# Patient Record
Sex: FEMALE | Race: WHITE | NOT HISPANIC OR LATINO
[De-identification: names, ages, dates, MRNs, and addresses within clinical notes are randomized per-mention and may not be internally consistent; named-entity substitution may affect disease eponyms.]

---

## 2019-01-16 PROBLEM — Z00.00 ENCOUNTER FOR PREVENTIVE HEALTH EXAMINATION: Status: ACTIVE | Noted: 2019-01-16

## 2021-04-10 ENCOUNTER — TRANSCRIPTION ENCOUNTER (OUTPATIENT)
Age: 56
End: 2021-04-10

## 2021-04-11 ENCOUNTER — INPATIENT (INPATIENT)
Facility: HOSPITAL | Age: 56
LOS: 0 days | Discharge: HOME | DRG: 353 | End: 2021-04-12
Attending: SURGERY | Admitting: SURGERY
Payer: COMMERCIAL

## 2021-04-11 ENCOUNTER — RESULT REVIEW (OUTPATIENT)
Age: 56
End: 2021-04-11

## 2021-04-11 ENCOUNTER — TRANSCRIPTION ENCOUNTER (OUTPATIENT)
Age: 56
End: 2021-04-11

## 2021-04-11 VITALS
OXYGEN SATURATION: 99 % | WEIGHT: 199.96 LBS | TEMPERATURE: 98 F | SYSTOLIC BLOOD PRESSURE: 180 MMHG | HEIGHT: 68 IN | RESPIRATION RATE: 16 BRPM | DIASTOLIC BLOOD PRESSURE: 107 MMHG | HEART RATE: 68 BPM

## 2021-04-11 DIAGNOSIS — Z90.49 ACQUIRED ABSENCE OF OTHER SPECIFIED PARTS OF DIGESTIVE TRACT: Chronic | ICD-10-CM

## 2021-04-11 DIAGNOSIS — K46.0 UNSPECIFIED ABDOMINAL HERNIA WITH OBSTRUCTION, WITHOUT GANGRENE: ICD-10-CM

## 2021-04-11 DIAGNOSIS — Z98.890 OTHER SPECIFIED POSTPROCEDURAL STATES: Chronic | ICD-10-CM

## 2021-04-11 LAB
ABO RH CONFIRMATION: SIGNIFICANT CHANGE UP
ALBUMIN SERPL ELPH-MCNC: 3.1 G/DL — LOW (ref 3.3–5)
ALP SERPL-CCNC: 117 U/L — SIGNIFICANT CHANGE UP (ref 40–120)
ALT FLD-CCNC: 27 U/L — SIGNIFICANT CHANGE UP (ref 12–78)
AMYLASE P1 CFR SERPL: 30 U/L — SIGNIFICANT CHANGE UP (ref 25–125)
ANION GAP SERPL CALC-SCNC: 5 MMOL/L — SIGNIFICANT CHANGE UP (ref 5–17)
APPEARANCE UR: CLEAR — SIGNIFICANT CHANGE UP
APTT BLD: 51.2 SEC — HIGH (ref 27.5–35.5)
AST SERPL-CCNC: 24 U/L — SIGNIFICANT CHANGE UP (ref 15–37)
BASE EXCESS BLDA CALC-SCNC: -7.7 MMOL/L — LOW (ref -2–2)
BILIRUB SERPL-MCNC: 0.6 MG/DL — SIGNIFICANT CHANGE UP (ref 0.2–1.2)
BILIRUB UR-MCNC: NEGATIVE — SIGNIFICANT CHANGE UP
BLOOD GAS COMMENTS ARTERIAL: SIGNIFICANT CHANGE UP
BUN SERPL-MCNC: 18 MG/DL — SIGNIFICANT CHANGE UP (ref 7–23)
CALCIUM SERPL-MCNC: 8.7 MG/DL — SIGNIFICANT CHANGE UP (ref 8.5–10.1)
CHLORIDE SERPL-SCNC: 112 MMOL/L — HIGH (ref 96–108)
CO2 SERPL-SCNC: 23 MMOL/L — SIGNIFICANT CHANGE UP (ref 22–31)
COLOR SPEC: SIGNIFICANT CHANGE UP
CREAT SERPL-MCNC: 1 MG/DL — SIGNIFICANT CHANGE UP (ref 0.5–1.3)
DIFF PNL FLD: NEGATIVE — SIGNIFICANT CHANGE UP
GLUCOSE SERPL-MCNC: 100 MG/DL — HIGH (ref 70–99)
GLUCOSE UR QL: NEGATIVE — SIGNIFICANT CHANGE UP
HCG SERPL-ACNC: 3 MIU/ML — SIGNIFICANT CHANGE UP
HCO3 BLDA-SCNC: SIGNIFICANT CHANGE UP MMOL/L (ref 23–27)
HCT VFR BLD CALC: 37.6 % — SIGNIFICANT CHANGE UP (ref 34.5–45)
HGB BLD-MCNC: 12.8 G/DL — SIGNIFICANT CHANGE UP (ref 11.5–15.5)
HOROWITZ INDEX BLDA+IHG-RTO: 60 — SIGNIFICANT CHANGE UP
INR BLD: 1.09 RATIO — SIGNIFICANT CHANGE UP (ref 0.88–1.16)
KETONES UR-MCNC: NEGATIVE — SIGNIFICANT CHANGE UP
LEUKOCYTE ESTERASE UR-ACNC: NEGATIVE — SIGNIFICANT CHANGE UP
LIDOCAIN IGE QN: 191 U/L — SIGNIFICANT CHANGE UP (ref 73–393)
MCHC RBC-ENTMCNC: 29.3 PG — SIGNIFICANT CHANGE UP (ref 27–34)
MCHC RBC-ENTMCNC: 34 GM/DL — SIGNIFICANT CHANGE UP (ref 32–36)
MCV RBC AUTO: 86 FL — SIGNIFICANT CHANGE UP (ref 80–100)
NITRITE UR-MCNC: NEGATIVE — SIGNIFICANT CHANGE UP
NRBC # BLD: 0 /100 WBCS — SIGNIFICANT CHANGE UP (ref 0–0)
PCO2 BLDA: 63 MMHG — HIGH (ref 32–46)
PH BLDA: 7.13 — CRITICAL LOW (ref 7.35–7.45)
PH UR: 6.5 — SIGNIFICANT CHANGE UP (ref 5–8)
PLATELET # BLD AUTO: 109 K/UL — LOW (ref 150–400)
PO2 BLDA: 73 MMHG — LOW (ref 74–108)
POTASSIUM SERPL-MCNC: 3.6 MMOL/L — SIGNIFICANT CHANGE UP (ref 3.5–5.3)
POTASSIUM SERPL-SCNC: 3.6 MMOL/L — SIGNIFICANT CHANGE UP (ref 3.5–5.3)
PROT SERPL-MCNC: 6.9 G/DL — SIGNIFICANT CHANGE UP (ref 6–8.3)
PROT UR-MCNC: NEGATIVE — SIGNIFICANT CHANGE UP
PROTHROM AB SERPL-ACNC: 12.7 SEC — SIGNIFICANT CHANGE UP (ref 10.6–13.6)
RBC # BLD: 4.37 M/UL — SIGNIFICANT CHANGE UP (ref 3.8–5.2)
RBC # FLD: 13 % — SIGNIFICANT CHANGE UP (ref 10.3–14.5)
SAO2 % BLDA: SIGNIFICANT CHANGE UP % (ref 92–96)
SARS-COV-2 RNA SPEC QL NAA+PROBE: SIGNIFICANT CHANGE UP
SODIUM SERPL-SCNC: 140 MMOL/L — SIGNIFICANT CHANGE UP (ref 135–145)
SP GR SPEC: 1 — LOW (ref 1.01–1.02)
UROBILINOGEN FLD QL: NEGATIVE — SIGNIFICANT CHANGE UP
WBC # BLD: 12.01 K/UL — HIGH (ref 3.8–10.5)
WBC # FLD AUTO: 12.01 K/UL — HIGH (ref 3.8–10.5)

## 2021-04-11 PROCEDURE — 99285 EMERGENCY DEPT VISIT HI MDM: CPT

## 2021-04-11 PROCEDURE — 99222 1ST HOSP IP/OBS MODERATE 55: CPT

## 2021-04-11 PROCEDURE — 93010 ELECTROCARDIOGRAM REPORT: CPT

## 2021-04-11 PROCEDURE — 49561: CPT | Mod: AS

## 2021-04-11 PROCEDURE — 99253 IP/OBS CNSLTJ NEW/EST LOW 45: CPT

## 2021-04-11 PROCEDURE — 88302 TISSUE EXAM BY PATHOLOGIST: CPT | Mod: 26

## 2021-04-11 PROCEDURE — 74177 CT ABD & PELVIS W/CONTRAST: CPT | Mod: 26,MA

## 2021-04-11 PROCEDURE — 71045 X-RAY EXAM CHEST 1 VIEW: CPT | Mod: 26,76

## 2021-04-11 RX ORDER — ACETAMINOPHEN 500 MG
1000 TABLET ORAL ONCE
Refills: 0 | Status: COMPLETED | OUTPATIENT
Start: 2021-04-11 | End: 2021-04-11

## 2021-04-11 RX ORDER — HYDROMORPHONE HYDROCHLORIDE 2 MG/ML
0.5 INJECTION INTRAMUSCULAR; INTRAVENOUS; SUBCUTANEOUS
Refills: 0 | Status: DISCONTINUED | OUTPATIENT
Start: 2021-04-11 | End: 2021-04-11

## 2021-04-11 RX ORDER — FUROSEMIDE 40 MG
20 TABLET ORAL ONCE
Refills: 0 | Status: COMPLETED | OUTPATIENT
Start: 2021-04-11 | End: 2021-04-11

## 2021-04-11 RX ORDER — DEXAMETHASONE 0.5 MG/5ML
10 ELIXIR ORAL ONCE
Refills: 0 | Status: COMPLETED | OUTPATIENT
Start: 2021-04-11 | End: 2021-04-11

## 2021-04-11 RX ORDER — HYDROMORPHONE HYDROCHLORIDE 2 MG/ML
1 INJECTION INTRAMUSCULAR; INTRAVENOUS; SUBCUTANEOUS EVERY 4 HOURS
Refills: 0 | Status: DISCONTINUED | OUTPATIENT
Start: 2021-04-11 | End: 2021-04-11

## 2021-04-11 RX ORDER — EPINEPHRINE 11.25MG/ML
3 SOLUTION, NON-ORAL INHALATION ONCE
Refills: 0 | Status: COMPLETED | OUTPATIENT
Start: 2021-04-11 | End: 2021-04-11

## 2021-04-11 RX ORDER — DEXAMETHASONE 0.5 MG/5ML
10 ELIXIR ORAL ONCE
Refills: 0 | Status: DISCONTINUED | OUTPATIENT
Start: 2021-04-11 | End: 2021-04-11

## 2021-04-11 RX ORDER — TOPIRAMATE 25 MG
100 TABLET ORAL DAILY
Refills: 0 | Status: DISCONTINUED | OUTPATIENT
Start: 2021-04-12 | End: 2021-04-12

## 2021-04-11 RX ORDER — CHLORHEXIDINE GLUCONATE 213 G/1000ML
1 SOLUTION TOPICAL
Refills: 0 | Status: DISCONTINUED | OUTPATIENT
Start: 2021-04-11 | End: 2021-04-12

## 2021-04-11 RX ORDER — PANTOPRAZOLE SODIUM 20 MG/1
40 TABLET, DELAYED RELEASE ORAL ONCE
Refills: 0 | Status: COMPLETED | OUTPATIENT
Start: 2021-04-11 | End: 2021-04-11

## 2021-04-11 RX ORDER — HEPARIN SODIUM 5000 [USP'U]/ML
5000 INJECTION INTRAVENOUS; SUBCUTANEOUS EVERY 12 HOURS
Refills: 0 | Status: DISCONTINUED | OUTPATIENT
Start: 2021-04-11 | End: 2021-04-11

## 2021-04-11 RX ORDER — ONDANSETRON 8 MG/1
4 TABLET, FILM COATED ORAL ONCE
Refills: 0 | Status: COMPLETED | OUTPATIENT
Start: 2021-04-11 | End: 2021-04-11

## 2021-04-11 RX ORDER — MORPHINE SULFATE 50 MG/1
4 CAPSULE, EXTENDED RELEASE ORAL ONCE
Refills: 0 | Status: DISCONTINUED | OUTPATIENT
Start: 2021-04-11 | End: 2021-04-11

## 2021-04-11 RX ORDER — SODIUM CHLORIDE 9 MG/ML
1000 INJECTION INTRAMUSCULAR; INTRAVENOUS; SUBCUTANEOUS
Refills: 0 | Status: DISCONTINUED | OUTPATIENT
Start: 2021-04-11 | End: 2021-04-11

## 2021-04-11 RX ORDER — ESCITALOPRAM OXALATE 10 MG/1
20 TABLET, FILM COATED ORAL DAILY
Refills: 0 | Status: DISCONTINUED | OUTPATIENT
Start: 2021-04-11 | End: 2021-04-12

## 2021-04-11 RX ORDER — SODIUM CHLORIDE 9 MG/ML
1000 INJECTION, SOLUTION INTRAVENOUS
Refills: 0 | Status: DISCONTINUED | OUTPATIENT
Start: 2021-04-11 | End: 2021-04-11

## 2021-04-11 RX ORDER — ONDANSETRON 8 MG/1
4 TABLET, FILM COATED ORAL ONCE
Refills: 0 | Status: DISCONTINUED | OUTPATIENT
Start: 2021-04-11 | End: 2021-04-11

## 2021-04-11 RX ORDER — SODIUM CHLORIDE 9 MG/ML
1000 INJECTION INTRAMUSCULAR; INTRAVENOUS; SUBCUTANEOUS ONCE
Refills: 0 | Status: COMPLETED | OUTPATIENT
Start: 2021-04-11 | End: 2021-04-11

## 2021-04-11 RX ORDER — LEVOTHYROXINE SODIUM 125 MCG
175 TABLET ORAL DAILY
Refills: 0 | Status: DISCONTINUED | OUTPATIENT
Start: 2021-04-11 | End: 2021-04-12

## 2021-04-11 RX ORDER — HYDROMORPHONE HYDROCHLORIDE 2 MG/ML
0.5 INJECTION INTRAMUSCULAR; INTRAVENOUS; SUBCUTANEOUS EVERY 4 HOURS
Refills: 0 | Status: DISCONTINUED | OUTPATIENT
Start: 2021-04-11 | End: 2021-04-11

## 2021-04-11 RX ORDER — PROPOFOL 10 MG/ML
20 INJECTION, EMULSION INTRAVENOUS
Qty: 1000 | Refills: 0 | Status: DISCONTINUED | OUTPATIENT
Start: 2021-04-11 | End: 2021-04-11

## 2021-04-11 RX ORDER — DEXAMETHASONE 0.5 MG/5ML
10 ELIXIR ORAL EVERY 8 HOURS
Refills: 0 | Status: COMPLETED | OUTPATIENT
Start: 2021-04-11 | End: 2021-04-12

## 2021-04-11 RX ORDER — CEFAZOLIN SODIUM 1 G
2000 VIAL (EA) INJECTION ONCE
Refills: 0 | Status: COMPLETED | OUTPATIENT
Start: 2021-04-11 | End: 2021-04-11

## 2021-04-11 RX ORDER — OXYCODONE AND ACETAMINOPHEN 5; 325 MG/1; MG/1
1 TABLET ORAL
Qty: 20 | Refills: 0
Start: 2021-04-11

## 2021-04-11 RX ADMIN — SODIUM CHLORIDE 100 MILLILITER(S): 9 INJECTION INTRAMUSCULAR; INTRAVENOUS; SUBCUTANEOUS at 09:13

## 2021-04-11 RX ADMIN — Medication 102 MILLIGRAM(S): at 18:11

## 2021-04-11 RX ADMIN — Medication 400 MILLIGRAM(S): at 14:32

## 2021-04-11 RX ADMIN — PROPOFOL 10.9 MICROGRAM(S)/KG/MIN: 10 INJECTION, EMULSION INTRAVENOUS at 13:03

## 2021-04-11 RX ADMIN — PANTOPRAZOLE SODIUM 40 MILLIGRAM(S): 20 TABLET, DELAYED RELEASE ORAL at 02:42

## 2021-04-11 RX ADMIN — ONDANSETRON 4 MILLIGRAM(S): 8 TABLET, FILM COATED ORAL at 02:39

## 2021-04-11 RX ADMIN — SODIUM CHLORIDE 1000 MILLILITER(S): 9 INJECTION INTRAMUSCULAR; INTRAVENOUS; SUBCUTANEOUS at 05:25

## 2021-04-11 RX ADMIN — Medication 20 MILLIGRAM(S): at 14:32

## 2021-04-11 RX ADMIN — SODIUM CHLORIDE 75 MILLILITER(S): 9 INJECTION, SOLUTION INTRAVENOUS at 13:02

## 2021-04-11 RX ADMIN — PROPOFOL 10.9 MICROGRAM(S)/KG/MIN: 10 INJECTION, EMULSION INTRAVENOUS at 14:29

## 2021-04-11 RX ADMIN — MORPHINE SULFATE 4 MILLIGRAM(S): 50 CAPSULE, EXTENDED RELEASE ORAL at 02:37

## 2021-04-11 RX ADMIN — Medication 0.5 MILLILITER(S): at 16:10

## 2021-04-11 RX ADMIN — Medication 20 MILLIGRAM(S): at 13:30

## 2021-04-11 RX ADMIN — MORPHINE SULFATE 4 MILLIGRAM(S): 50 CAPSULE, EXTENDED RELEASE ORAL at 05:25

## 2021-04-11 RX ADMIN — HYDROMORPHONE HYDROCHLORIDE 0.5 MILLIGRAM(S): 2 INJECTION INTRAMUSCULAR; INTRAVENOUS; SUBCUTANEOUS at 15:51

## 2021-04-11 RX ADMIN — SODIUM CHLORIDE 1000 MILLILITER(S): 9 INJECTION INTRAMUSCULAR; INTRAVENOUS; SUBCUTANEOUS at 02:44

## 2021-04-11 RX ADMIN — HYDROMORPHONE HYDROCHLORIDE 0.5 MILLIGRAM(S): 2 INJECTION INTRAMUSCULAR; INTRAVENOUS; SUBCUTANEOUS at 16:03

## 2021-04-11 RX ADMIN — Medication 1000 MILLIGRAM(S): at 15:02

## 2021-04-11 NOTE — ED PROVIDER NOTE - OBJECTIVE STATEMENT
abdominal pain for the past 2 days with nausea, denies vomiting.  abdominal pain 56 y/o female h/o ventral abdominal Hernia  HTN Hypothyroid   (ITP)  Seizures  C/C abdominal pain for the past 2 days, increased this evening,  with nausea, denies vomiting. some constipation, no fever, no chills, no urinary symptoms, no CP, no SOB, no covid no stroke symptoms. 56 y/o female h/o cholecystectomy 25 years ago, H/O  ventral abdominal Hernia , HTN ,Hypothyroid,  ITP,   Seizures  C/C abdominal pain for the past 2 days, increased this evening,  with nausea, no vomiting. slight constipation, no fever, no chills, no urinary symptoms, no GIB, no CP, no SOB, no covid no stroke symptoms.

## 2021-04-11 NOTE — DISCHARGE NOTE PROVIDER - NSDCCPTREATMENT_GEN_ALL_CORE_FT
PRINCIPAL PROCEDURE  Procedure: Laparoscopic repair of incarcerated incisional hernia  Findings and Treatment:

## 2021-04-11 NOTE — ED ADULT NURSE NOTE - NSIMPLEMENTINTERV_GEN_ALL_ED
Implemented All Universal Safety Interventions:  [place] to call system. Call bell, personal items and telephone within reach. Instruct patient to call for assistance. Room bathroom lighting operational. Non-slip footwear when patient is off stretcher. Physically safe environment: no spills, clutter or unnecessary equipment. Stretcher in lowest position, wheels locked, appropriate side rails in place.

## 2021-04-11 NOTE — DISCHARGE NOTE PROVIDER - NSDCMRMEDTOKEN_GEN_ALL_CORE_FT
escitalopram 20 mg oral tablet: 1 tab(s) orally once a day  losartan-hydrochlorothiazide 50mg-12.5mg oral tablet: 1 tab(s) orally once a day  Motrin 800 mg oral tablet: 1 tab(s) orally 3 times a day, As Needed for mild/moderate pain  Trokendi  mg oral capsule, extended release: 1 cap(s) orally once a day  Unithroid 175 mcg (0.175 mg) oral tablet: 1 tab(s) orally once a day   escitalopram 20 mg oral tablet: 1 tab(s) orally once a day  losartan-hydrochlorothiazide 50mg-12.5mg oral tablet: 1 tab(s) orally once a day  Motrin 600 mg oral tablet: 1 tab(s) orally 3 times a day (after meals), As Needed  Percocet 5 mg-325 mg oral tablet: 1 tab(s) orally every 4 to 6 hours, As Needed  -for severe pain MDD:6   Trokendi  mg oral capsule, extended release: 1 cap(s) orally once a day  Unithroid 175 mcg (0.175 mg) oral tablet: 1 tab(s) orally once a day

## 2021-04-11 NOTE — ED ADULT NURSE REASSESSMENT NOTE - NS ED NURSE REASSESS COMMENT FT1
0300 Pt scheduled for CT scan abdomen/ pelvis, Oral contrast Omnipaque 30ml mixed in sterile water per package instructions, & pt started drinking. Observed for tolerance

## 2021-04-11 NOTE — H&P ADULT - NSHPLABSRESULTS_GEN_ALL_CORE
LABS:                      12.8   12.01 )-----------( 109      ( 11 Apr 2021 02:54 )             37.6     04-11    140  |  112<H>  |  18  ----------------------------<  100<H>  3.6   |  23  |  1.00    Ca    8.7      11 Apr 2021 02:54    TPro  6.9  /  Alb  3.1<L>  /  TBili  0.6  /  DBili  x   /  AST  24  /  ALT  27  /  AlkPhos  117  04-11    RADIOLOGY:  < from: CT Abdomen and Pelvis w/ Oral Cont and w/ IV Cont (04.11.21 @ 06:34) >  IMPRESSION:  Small fat-containing midline umbilical ventral abdominal wall hernia with slight infiltration of the fat in the hernia sac which is new compared with prior exam and may represent incarcerated mesenteric fat.  No bowel obstruction or evidence of bowel inflammation.  Bilateral adrenal nodules, statistically adenomas; nonemergent MRI of the abdomen may be considered for better characterization.

## 2021-04-11 NOTE — DISCHARGE NOTE PROVIDER - HOSPITAL COURSE
55 year old female presented to [place] ED on 4/11/21 with 2 day history of incisional hernia pain.  Labs drawn in ED showing WBC of 12.  COVID-19 PCR was negative.  CT abd/pel performed showing a small fat-containing midline umbilical ventral abdominal wall hernia with slight infiltration of the fat in the hernia sac which is new compared with prior exam and may represent incarcerated mesenteric fat, no bowel obstruction or evidence of bowel inflammation.  Also with incidental finding of bilateral adrenal nodules, statistically adenomas; nonemergent MRI of the abdomen may be considered for better characterization.  Patient was admitted, made NPO, placed on IV fluids, given pre-op dose of Ancef 2g, cleared by medicine, hem/onc, and cardiology, and taken to the OR to undergo laparoscopic incisional hernia repair with Dr. Mina.  Postoperative course complicated by failed extubation with subsequent re-intubation due to acute hypoxic respiratory failure.  Postop CXR showing pulmonary edema; dose of lasix administered.  She was transferred to the ICU for monitoring.  POD#0 she was successful extubated and placed on a ventimask. 55 year old female presented to [place] ED on 4/11/21 with 2 day history of incisional hernia pain.  Labs drawn in ED showing WBC of 12.  COVID-19 PCR was negative.  CT abd/pel performed showing a small fat-containing midline umbilical ventral abdominal wall hernia with slight infiltration of the fat in the hernia sac which is new compared with prior exam and may represent incarcerated mesenteric fat, no bowel obstruction or evidence of bowel inflammation.  Also with incidental finding of bilateral adrenal nodules, statistically adenomas; nonemergent MRI of the abdomen may be considered for better characterization.  Patient was admitted, made NPO, placed on IV fluids, given pre-op dose of Ancef 2g, cleared by medicine, hem/onc, and cardiology, and taken to the OR to undergo laparoscopic incisional hernia repair with Dr. Mina.  Postoperative course complicated by failed extubation with subsequent re-intubation due to acute hypoxic respiratory failure.  Postop CXR showing pulmonary edema; dose of lasix administered.  She was transferred to the ICU for monitoring.  POD#0 she was successful extubated and placed on a ventimask, and given dose of racemic epinephrine. 55 year old female presented to [place] ED on 4/11/21 with 2 day history of incisional hernia pain.  Labs drawn in ED showing WBC of 12.  COVID-19 PCR was negative.  CT abd/pel performed showing a small fat-containing midline umbilical ventral abdominal wall hernia with slight infiltration of the fat in the hernia sac which is new compared with prior exam and may represent incarcerated mesenteric fat, no bowel obstruction or evidence of bowel inflammation.  Also with incidental finding of bilateral adrenal nodules, statistically adenomas; nonemergent MRI of the abdomen may be considered for better characterization.  Patient was admitted, made NPO, placed on IV fluids, given pre-op dose of Ancef 2g, cleared by medicine, hem/onc, and cardiology, and taken to the OR to undergo laparoscopic incisional hernia repair with Dr. Mina.  Postoperative course complicated by failed extubation with subsequent re-intubation due to acute hypoxic respiratory failure.  Postop CXR showing pulmonary edema; dose of lasix administered.  She was transferred to the ICU for monitoring.  POD#0 she was successful extubated and placed on a ventimask, and given dose of racemic epinephrine.  On POD#1, patient weaned off O2, saturating well on room air, voiding, advanced to regular diet as tolerated. 55 year old female presented to [place] ED on 4/11/21 with 2 day history of incisional hernia pain.  Labs drawn in ED showing WBC of 12.  COVID-19 PCR was negative.  CT abd/pel performed showing a small fat-containing midline umbilical ventral abdominal wall hernia with slight infiltration of the fat in the hernia sac which is new compared with prior exam and may represent incarcerated mesenteric fat, no bowel obstruction or evidence of bowel inflammation.  Also with incidental finding of bilateral adrenal nodules, statistically adenomas; nonemergent MRI of the abdomen may be considered for better characterization.  Patient was admitted, made NPO, placed on IV fluids, given pre-op dose of Ancef 2g, cleared by medicine, hem/onc, and cardiology, and taken to the OR to undergo laparoscopic incisional hernia repair with Dr. Mina.  Postoperative course complicated by failed extubation with subsequent re-intubation due to acute hypoxic respiratory failure.  Postop CXR showing pulmonary edema; dose of lasix administered.  She was transferred to the ICU for monitoring.  POD#0 she was successful extubated and placed on a ventimask, and given dose of racemic epinephrine.  On POD#1, patient weaned off O2, saturating well on room air, voiding, advanced to regular diet as tolerated.  Patient cleared for discharge home on POD#1 with outpatient follow up with Dr. Mina in 1 week.

## 2021-04-11 NOTE — H&P ADULT - HISTORY OF PRESENT ILLNESS
55 year old female smoker with PMH ITP, seizures, HTN, hypothyroid, depression, hx CCY 25 years ago with subsequent incisional hernia x 10 years, presents with 6/10 supraumbilical abdominal pain around hernia which started 2 nights ago, subsided yesterday but then returned last night, with mild associated nausea but no vomiting.  Hernia had never caused problems in the past.  Patient states she is not passing flatus; last BM this AM was loose.  Patient with hx of ITP diagnosed 1 year ago; had been seeing hem/onc Dr. Evangelist Pena (recently ), last saw PA in office in 2021 and was told PLT count ok for surgery.  PLT count here 109, which patient states is similar to here previous count.  Patient denies any fever, chills, chest pain, shortness of breath, vomiting, melena, hematochezia. 55 year old female smoker with PMH ITP, seizures, HTN, hypothyroid, depression, hx CCY 25 years ago with subsequent incisional hernia x 10 years, presents with 6/10 supraumbilical abdominal pain around hernia which started 2 nights ago, subsided yesterday but then returned last night, with mild associated nausea but no vomiting.  Hernia had never caused problems in the past.  Patient states she is not passing flatus; last BM this AM was loose.  Patient with hx of ITP diagnosed 1 year ago; had been seeing hem/onc Dr. Evangelist Pena (recently ), last saw PA in office in 2021 and was told PLT count ok for surgery.  PLT count here 109, which patient states is similar to here previous count.  Has drunk 1 bottle of water today; last meal was soup for dinner last night.  Patient denies any fever, chills, chest pain, shortness of breath, vomiting, melena, hematochezia.

## 2021-04-11 NOTE — CONSULT NOTE ADULT - SUBJECTIVE AND OBJECTIVE BOX
All records reviewed.    HPI:  54 y/o woman w hx ITP(dx'd 2020, under care Curtis Blanca and John Moore, Rx'd w steroids w effect, off [id number], last 3-4mon baseline plts 107k-109k), seizures, HTN, hypothyroid, depression.  Adm w 2 days pain at abdominal incisional hernia site(post cholecystectomy 25yrs ago), w/u here sig for incarcerated ventral incisional hernia, for emergent surgery. CBC plts 109K, needing Heme clearance  Pt denies incr or abnormal bleed/bruise, no recent use of ASA/NSAIDs/vit E/fish oil  Never had bleeding complications w previous surgeries/invasive procedures.    PAST MEDICAL & SURGICAL HISTORY:  Idiopathic thrombocytopenic purpura (ITP)    Seizures    HTN (hypertension)    Hypothyroid    Hernia    S/P cholecystectomy 25yrs ago    H/O sinus surgery x2 for polypectomy 8yrs  ago and 20yrs ago    s/p wisdom tooth extraction adn vaginal deliveries x2        Review of System:    MEDICATIONS  (STANDING):  sodium chloride 0.9%. 1000 milliLiter(s) (100 mL/Hr) IV Continuous <Continuous>    MEDICATIONS  (PRN):  HYDROmorphone  Injectable 0.5 milliGRAM(s) IV Push every 4 hours PRN Moderate Pain (4 - 6)  HYDROmorphone  Injectable 1 milliGRAM(s) IV Push every 4 hours PRN Severe Pain (7 - 10)      Allergies    No Known Allergies    Intolerances        SOCIAL HISTORY:  active tobacco smoker x25yrs 1/2ppd  rare Etoh    FAMILY HISTORY:  no blood dyscrasia      Vital Signs Last 24 Hrs  T(C): 36.9 (2021 09:22), Max: 36.9 (2021 09:22)  T(F): 98.4 (2021 09:22), Max: 98.4 (2021 09:22)  HR: 70 (2021 09:22) (68 - 76)  BP: 128/66 (2021 09:22) (122/70 - 180/107)  BP(mean): --  RR: 16 (2021 09:22) (16 - 16)  SpO2: 97% (2021 09:22) (97% - 99%)    PHYSICAL EXAM:      General:well developed well nourished, in no acute distress  Eyes:sclera anicteric, pupils equal and reactive to light  Neck:supple, trachea midline  Lungs:clear, no wheeze/rhonchi  Cardiovascular:regular rate and rhythm, S1 S2  Abdomen:soft,pouchy,  nontender, no organomegaly present, bowel sounds normal  Neurological:alert and oriented x3, Cranial Nerves II-XII grossly intact  Skin:no increased ecchymosis/petechiae/purpura  Lymph Nodes:no palpable cervical/supraclavicular lymph nodes enlargements  Extremities:no cyanosis/clubbing/edema        LABS:                        12.8   12. )-----------( 109      ( 2021 02:54 )             37.6     04-11 @ 02:54  WBC12.01  RBC4.37 Hgb12.8 Hct37.6  MCV86.0  Oxas065  Auto Neutro-- Band-- Auto Lymph-- Atypical Lymph-- Reactive Lymph-- Auto Mono-- Auto Eos-- Auto Baso--              140  |  112<H>  |  18  ----------------------------<  100<H>  3.6   |  23  |  1.00    Ca    8.7      2021 02:54    TPro  6.9  /  Alb  3.1<L>  /  TBili  0.6  /  DBili  x   /  AST  24  /  ALT  27  /  AlkPhos  117        Urinalysis Basic - ( 2021 07:49 )    Color: Pale Yellow / Appearance: Clear / S.005 / pH: x  Gluc: x / Ketone: Negative  / Bili: Negative / Urobili: Negative   Blood: x / Protein: Negative / Nitrite: Negative   Leuk Esterase: Negative / RBC: x / WBC x   Sq Epi: x / Non Sq Epi: x / Bacteria: x        PERIPHERAL BLOOD SMEAR REVIEW:  RBC-normocytic, normochromic, no significant anisocytosis or poikilocytosis. No rouleaux/schistocytes or increased polychromasia.  WBC-normal in differential and morphology, no immature or abnormal cells seen.  Platelets-adequate on smear, no platelets clumping or giant platelets.       RADIOLOGY & ADDITIONAL STUDIES:  < from: CT Abdomen and Pelvis w/ Oral Cont and w/ IV Cont (21 @ 06:34) >    EXAM:  CT ABDOMEN AND PELVIS OC IC                            PROCEDURE DATE:  2021          INTERPRETATION:  CLINICAL INFORMATION: Upper abdominal pain.    COMPARISON: 2011.    CONTRAST/COMPLICATIONS:  IV Contrast: Omnipaque 350  90 cc administered   10 cc discarded  Oral Contrast: Omnipaque 300  Complications: None reported at time of study completion    PROCEDURE:  CT of the Abdomen and Pelvis was performed.  Sagittal and coronal reformats were performed.    FINDINGS:  LOWER CHEST:Right basilar dependent and platelike atelectasis.    LIVER: Within normal limits.  BILE DUCTS: Normal caliber.  GALLBLADDER: Cholecystectomy.  SPLEEN: Within normal limits.  PANCREAS: Within normal limits.  ADRENALS: Left adrenal nodule measuring 1.7 cm and right adrenal nodule measuring up to 1.1 cm.  KIDNEYS/URETERS: Kidneys enhance symmetrically without hydronephrosis or focal renal parenchymal lesion.    BLADDER: Within normal limits.  REPRODUCTIVE ORGANS: Uterus and adnexa are unremarkable.    BOWEL: No bowel obstruction or overt bowel wall thickening. Appendix is normal.  PERITONEUM: No ascites, pneumoperitoneum, or loculated collection. No mesenteric lymphadenopathy.  VESSELS: Mild atherosclerotic calcification of the aortoiliac tree.Normal caliber abdominal aorta.  RETROPERITONEUM/LYMPH NODES: No lymphadenopathy.  ABDOMINAL WALL: Small fat-containing midline supraumbilical ventral abdominal wall hernia with slight infiltration of the fat in the hernia sac.  BONES: Mild degenerative changes of the spine.    IMPRESSION:  Small fat-containing midline umbilical ventral abdominal wall hernia with slight infiltration of the fat in the hernia sac which is new compared with prior exam and may represent incarcerated mesenteric fat.    No bowel obstruction or evidence of bowel inflammation.    Bilateral adrenal nodules, statistically adenomas; nonemergent MRI of the abdomen may be considered for better characterization.        < end of copied text >

## 2021-04-11 NOTE — CONSULT NOTE ADULT - ASSESSMENT
55f 1/2 ppd smoker with PMH ITP, seizures, HTN, hypothyroid, depression, hx leonidas 25 years ago with subsequent incisional hernia x 10 years, presents with 6/10 supraumbilical abdominal pain around hernia which started 2 nights ago, subsided yesterday but then returned last night, with mild associated nausea but no vomiting.  Hernia had never caused problems in the past.  Patient states she is not passing flatus; last BM this AM was loose.  Patient with hx of ITP diagnosed 1 year ago; had been seeing hem/onc Dr. Evangelist Pena (recently ), last saw PA in office in 2021 and was told PLT count ok for surgery.  PLT count here 109, which patient states is similar to here previous count.  Has drunk 1 bottle of water doa; last meal was soup for dinner last night.  Patient denies any fever, chills, chest pain, shortness of breath, vomiting, melena, hematochezia.      At baseline she is sedentary.  She does not report sxs suggestive of angina, hf or arrhythmia.    She is planned for urgent repair of incarcerated hernia    -there is no evidence of acute ischemia.  -there is NO imi on ekg    -there is no evidence of significant arrhythmia.  -there is no evidence for meaningful  volume overload.    -no baseline cardiac sxs  -fred on ekg likely vagal  -normal hr and bp since and on exam    -optimized from a cv perspective for planned urgent surgery.  routine hemodynamic monitoring is recommended    -hold losartan hct for now, can resume as needed pending clinical course    -DVT prophylaxis  -monitor electrolytes, keep k>4, Mg>2     -will follow

## 2021-04-11 NOTE — H&P ADULT - NSHPREVIEWOFSYSTEMS_GEN_ALL_CORE
CONSTITUTIONAL: No weakness, fevers or chills  EYES/ENT: No visual changes;  No vertigo or throat pain   NECK: No pain or stiffness  RESPIRATORY: No cough, wheezing, hemoptysis; No shortness of breath  CARDIOVASCULAR: No chest pain or palpitations  GASTROINTESTINAL: See HPI.  No vomiting, or hematemesis; No constipation. No melena or hematochezia.  GENITOURINARY: No dysuria, frequency or hematuria  NEUROLOGICAL: No numbness or weakness  SKIN: No itching, burning, rashes, or lesions   All other review of systems is negative unless indicated above.

## 2021-04-11 NOTE — CONSULT NOTE ADULT - ASSESSMENT
55 year old female smoker with PMH ITP, seizures, HTN, hypothyroid, depression, hx CCY 25 years ago with incarcerated incisional hernia.    - Admit to surgical service under Dr. Mina  -HTN: BP is stable. resume oral meds after surgery. takes Losartan/ HCTZ 50/12.5mg oral daily.   -Hypothyroidism: Takes Unithyroid 175 mcg oral daily, can resume after surgery, when eating. If remains NPO after surgery, can switch to IV form.  -Seizure d/o: Has been seizure free. Takes Trokendi XR 50mg Cap, 2 caps daily . Can resume after surgery.   -ITP: Platelet count is 109. No s/p of bleeding, not on any medications. Seen by hematologist Dr. Selin Veloz here.  -Depression:  takes escitalopram 20mg oral daily, can also resume that after surgery.  - Tobacco Use: Smoking cessation education provided. Nicotine Patch offered, patient wants to think about it because states that might go home after surgery today. If stays recommend to order 21mg daily.  - Patient is medically optimized for this urgent procedure. Hematology to provide clearance from their perspective as well.    55 year old female smoker with PMH ITP, seizures, HTN, hypothyroid, depression, hx CCY 25 years ago with incarcerated incisional hernia.    - Admit to surgical service under Dr. Mina  -HTN: BP is stable. resume oral meds after surgery. takes Losartan/ HCTZ 50/12.5mg oral daily.   -Hypothyroidism: Takes Unithyroid 175 mcg oral daily, can resume after surgery, when eating. If remains NPO after surgery, can switch to IV form.  -Seizure d/o: Has been seizure free. Takes Trokendi XR 50mg Cap, 2 caps daily . Can resume after surgery.   -ITP: Platelet count is 109. No s/p of bleeding, not on any medications. Seen by hematologist Dr. Selin Veloz here.  -Depression:  takes escitalopram 20mg oral daily, can also resume that after surgery.  - Tobacco Use: Smoking cessation education provided. Nicotine Patch offered, patient wants to think about it because states that might go home after surgery today. If stays recommend to order 21mg daily.  - Patient is medically optimized for this urgent procedure. Hematology to provide clearance from their perspective as well.   -Sinus bradycardia on EKG. Patient not any BB. Recommend cardio consult and clearance for OR as well.

## 2021-04-11 NOTE — DISCHARGE NOTE PROVIDER - CARE PROVIDER_API CALL
Ethan Mina  SURGERY  [address] [facility] [address]  [place] [address]  Phone: [phone]  Fax: [phone]  Follow Up Time: 1 week

## 2021-04-11 NOTE — ED PROVIDER NOTE - CARE PROVIDER_API CALL
Mikel Romero ()  Internal Medicine  [address] [place] [address]  Phone: [phone]  Fax: [phone]  Follow Up Time: 1-3 Days

## 2021-04-11 NOTE — ED ADULT NURSE REASSESSMENT NOTE - NS ED NURSE REASSESS COMMENT FT1
pt aox4, feeling much better, no n/v, abd pain 2/10, IVF infusing well, report given to OR  RN Bhakti

## 2021-04-11 NOTE — CONSULT NOTE ADULT - SUBJECTIVE AND OBJECTIVE BOX
55 year old female smoker with PMH ITP, seizures, HTN, hypothyroid, depression, hx CCY 25 years ago with subsequent incisional hernia x 10 years, presents with 6/10 supraumbilical abdominal pain around hernia which started 2 nights ago, subsided yesterday but then returned last night, with mild associated nausea but no vomiting.  Hernia had never caused problems in the past.  Patient states she is not passing flatus; last BM this AM was loose.  Patient with hx of ITP diagnosed 1 year ago; had been seeing hem/onc Dr. Evangelist Pena (recently ), last saw PA in office in 2021 and was told PLT count ok for surgery.  PLT count here 109, which patient states is similar to here previous count.  Has drunk 1 bottle of water today; last meal was soup for dinner last night.  Patient denies any fever, chills, chest pain, shortness of breath, vomiting, melena, hematochezia.    MEDICATIONS  (STANDING):  sodium chloride 0.9%. 1000 milliLiter(s) (100 mL/Hr) IV Continuous <Continuous>    MEDICATIONS  (PRN):  HYDROmorphone  Injectable 0.5 milliGRAM(s) IV Push every 4 hours PRN Moderate Pain (4 - 6)  HYDROmorphone  Injectable 1 milliGRAM(s) IV Push every 4 hours PRN Severe Pain (7 - 10)      Allergies    No Known Allergies    Intolerances        REVIEW OF SYSTEMS:  CONSTITUTIONAL: No fever, weight loss, or fatigue  EYES: No eye pain, visual disturbances, or discharge  ENMT:  No difficulty hearing, tinnitus, vertigo; No sinus or throat pain  NECK: No pain or stiffness  BREASTS: No pain, masses, or nipple discharge  RESPIRATORY: No cough, wheezing, chills or hemoptysis; No shortness of breath  CARDIOVASCULAR: No chest pain, palpitations, dizziness, or leg swelling  GASTROINTESTINAL: +bdominal  pain. No nausea, vomiting, or hematemesis; No diarrhea or constipation. No melena or hematochezia.  GENITOURINARY: No dysuria, frequency, hematuria, or incontinence  NEUROLOGICAL: No headaches, memory loss, loss of strength, numbness, or tremors  SKIN: No itching, burning, rashes, or lesions   LYMPH NODES: No enlarged glands  ENDOCRINE: No heat or cold intolerance; No hair loss; No polydipsia or polyuria  MUSCULOSKELETAL: No joint pain or swelling; No muscle, back, or extremity pain  PSYCHIATRIC: No depression, anxiety, mood swings, or difficulty sleeping  HEME/LYMPH: No easy bruising, or bleeding gums  ALLERGY AND IMMUNOLOGIC: No hives or eczema    Vital Signs Last 24 Hrs  T(C): 36.9 (2021 09:22), Max: 36.9 (2021 09:22)  T(F): 98.4 (:), Max: 98.4 (:)  HR: 70 (:) (68 - 76)  BP: 128/66 (:) (122/70 - 180/107)  BP(mean): --  RR: 16 (:) (16 - 16)  SpO2: 97% () (97% - 99%)    PHYSICAL EXAM:  GENERAL: NAD, well-groomed, well-developed  HEAD:  Atraumatic, Normocephalic  EYES: EOMI, PERRLA, conjunctiva and sclera clear  ENMT: No tonsillar erythema, exudates, or enlargement; Moist mucous membranes, Good dentition, No lesions  NECK: Supple, No JVD, Normal thyroid  NERVOUS SYSTEM:  Alert & Oriented X3, Good concentration; Motor Strength 5/5 B/L upper and lower extremities; DTRs 2+ intact and symmetric  CHEST/LUNG: Clear to auscultation bilaterally; No rales, rhonchi, wheezing, or rubs  HEART: Regular rate and rhythm; No murmurs, rubs, or gallops  ABDOMEN: Soft, + mild tenderness periumbilical area  EXTREMITIES:  2+ Peripheral Pulses, No clubbing, cyanosis, or edema  LYMPH: No lymphadenopathy noted  SKIN: No rashes or lesions    LABS:                        12.8   12.01 )-----------( 109      ( 2021 02:54 )             37.6     2021 02:54    140    |  112    |  18     ----------------------------<  100    3.6     |  23     |  1.00     Ca    8.7        2021 02:54    TPro  6.9    /  Alb  3.1    /  TBili  0.6    /  DBili  x      /  AST  24     /  ALT  27     /  AlkPhos  117    2021 02:54      CAPILLARY BLOOD GLUCOSE        BLOOD CULTURE    RADIOLOGY & ADDITIONAL TESTS:    Imaging Personally Reviewed:  [ ] YES     Consultant(s) Notes Reviewed:      Care Discussed with Consultants/Other Providers:

## 2021-04-11 NOTE — CONSULT NOTE ADULT - SUBJECTIVE AND OBJECTIVE BOX
Patient is a 55y old  Female who presents with a chief complaint of Hernia Surgery (2021 09:42)    HPI:  55 year old female smoker with PMH ITP, seizures, HTN, hypothyroid, depression, hx CCY 25 years ago with subsequent incisional hernia x 10 years, presents with 6/10 supraumbilical abdominal pain around hernia which started 2 nights ago, subsided yesterday but then returned last night, with mild associated nausea but no vomiting.  Hernia had never caused problems in the past.  Patient states she is not passing flatus; last BM this AM was loose.  Patient with hx of ITP diagnosed 1 year ago; had been seeing hem/onc Dr. Evangelist Pena (recently ), last saw PA in office in 2021 and was told PLT count ok for surgery.  PLT count here 109, which patient states is similar to here previous count.  Has drunk 1 bottle of water today; last meal was soup for dinner last night.  Patient denies any fever, chills, chest pain, shortness of breath, vomiting, melena, hematochezia. (2021 09:11)    Allergies: No Known Allergies    PAST MEDICAL & SURGICAL HISTORY:  Idiopathic thrombocytopenic purpura (ITP)    Seizures    HTN (hypertension)    Hypothyroid    Hernia    S/P cholecystectomy    H/O sinus surgery      FAMILY HISTORY:    SOCIAL HISTORY:    Home Medications:    Review of Systems:  Constitutional: no fever, chills, fatigue  Neuro: no headache, numbness, weakness  Resp: no cough, wheezing, shortness of breath  CVS: no chest pain, palpitations, leg swelling  GI: no abdominal pain, nausea, vomiting, diarrhea   : no dysuria, frequency, incontinence  Skin: no itching, burning, rashes, or lesions   Msk: no joint pain or swelling  Psych: no depression, anxiety, mood swings    T(F): 97.2 (21 @ 13:41), Max: 98.4 (21 @ 09:22)  HR: 76 (21 @ 13:50) (61 - 89)  BP: 129/61 (21 @ 13:31) (122/70 - 180/107)  RR: 18 (21 @ 13:41) (10 - 18)  SpO2: 94% (21 @ 13:50)  Wt(kg): --  Mode: AC/ CMV (Assist Control/ Continuous Mandatory Ventilation), RR (machine): 18, TV (machine): 550, FiO2: 70, PEEP: 5  CAPILLARY BLOOD GLUCOSE        I&O's Summary    2021 07:01  -  2021 14:21  --------------------------------------------------------  IN: 171.8 mL / OUT: 0 mL / NET: 171.8 mL        Physical Exam:     Gen:  Neuro:  HEENT:  CVS:  Resp:  Abd:  Ext:  Skin:    Meds:    furosemide   Injectable 20 milliGRAM(s) IV Push once           acetaminophen  IVPB .. 1000 milliGRAM(s) IV Intermittent once  HYDROmorphone  Injectable 0.5 milliGRAM(s) IV Push every 10 minutes PRN  ondansetron Injectable 4 milliGRAM(s) IV Push once PRN  propofol Infusion 20 MICROgram(s)/kG/Min IV Continuous <Continuous>                       chlorhexidine 4% Liquid 1 Application(s) Topical <User Schedule>                              12.8   12. )-----------( 109      ( 2021 02:54 )             37.6       04-11    140  |  112<H>  |  18  ----------------------------<  100<H>  3.6   |  23  |  1.00    Ca    8.7      2021 02:54    TPro  6.9  /  Alb  3.1<L>  /  TBili  0.6  /  DBili  x   /  AST  24  /  ALT  27  /  AlkPhos  117  04-11          PT/INR - ( 2021 10:17 )   PT: 12.7 sec;   INR: 1.09 ratio         PTT - ( 2021 10:17 )  PTT:51.2 sec  Urinalysis Basic - ( 2021 07:49 )    Color: Pale Yellow / Appearance: Clear / S.005 / pH: x  Gluc: x / Ketone: Negative  / Bili: Negative / Urobili: Negative   Blood: x / Protein: Negative / Nitrite: Negative   Leuk Esterase: Negative / RBC: x / WBC x   Sq Epi: x / Non Sq Epi: x / Bacteria: x            ABG - ( 2021 13:17 )  pH, Arterial: 7.13  pH, Blood: x     /  pCO2: 63    /  pO2: 73    / HCO3: ?16.6 / Base Excess: -7.7  /  SaO2: ?89.8             Radiology:  EXAM:  XR CHEST AP OR PA 1V                          EXAM:  XR CHEST PORTABLE IMMED 1V                            PROCEDURE DATE:  2021          INTERPRETATION:  TIME OF EXAM: 2021 at 3:12 AM.    CLINICAL INFORMATION: Abdominal pain.    COMPARISON:  AP chest x-ray and CT chest from May 4, 2011.    TECHNIQUE:   AP Portable chest x-ray.    INTERPRETATION:    The heart is not enlarged. The mediastinum is not accurately evaluated on this image. The mine are unremarkable.  The lungs are clear.  No pleural effusion or pneumothorax is noted.  No pneumoperitoneum is seen.    AP portable chest x-ray from 2021 at 1:51 PM:    CLINICAL INFORMATION: Status post hernia repair. Wheezing.    INTERPRETATION:    New ET tube with tip2.7 cm above the fanta.  Right lung is clear.  There is new pulmonary vascular redistribution/congestion on the left.  There is new left basilar is/retrocardiac opacity with obscuration of part of the left hemidiaphragm.  There is new platelike atelectasis in the left mid to lower lung.  No right pleural effusion. No pneumothorax.            IMPRESSION:  ET tube 2.7 cm above the fanta on most recent image.    New pulmonary vascular redistribution/congestion on the left on most recent image.    New left mid to lower lung platelike atelectasis.    New left basilar and retrocardiac opacity which may be due to a left pleural effusion with passive atelectasis, atelectasis of other cause, and/or pneumonia in the appropriate clinical context.            ILVIA DARDEN MD; Attending Radiologist  This document has been electronically signed. 2021  1:24PM    Problems  -Acute hypoxic/hypercapnic respiratory failure  -  -  -    56 yo F pmhx ITP, seizures, HTN, Hypothyorid, depression admitted with incarcerated incisional hernia. POD#0 s/p laparoscopic repair incarcerated incisional hernia. Post-op course complicated by failed extubation with subsequent re-intubation due to acute hypoxic respiratory failure    -s/p Decadron 10mg for possible airway edema  -ABG noted hypoxia/hypercapnia. RR increased to 18 and FiO2 increased to 60%. Current vent settings 18/550/60%/+5. Actively titrating for O2 sat >92%. Repeat ABG  -CXR concerning for pulmonary edema (possible negative pressure with initial extubation) Ordered for Lasix IVP. Monitor urine output  -  -        Critical care time spent (mins):  Patient is a 55y old  Female who presents with a chief complaint of Hernia Surgery (2021 09:42)    HPI:  54 yo F pmhx ITP, seizures, HTN, Hypothyorid, depression admitted with incarcerated incisional hernia. POD#0 s/p laparoscopic repair incarcerated incisional hernia w/ EBL 15cc. Post-op patient failed extubation w/ stridor and hypoxic respiratory failure requiring subsequent re-intubation. At bedside patient on mechanical ventilator and sedated with Propofol gtt. ABG noted hypoxia and hypercapnia.    Allergies: No Known Allergies    PAST MEDICAL & SURGICAL HISTORY:  Idiopathic thrombocytopenic purpura (ITP)    Seizures    HTN (hypertension)    Hypothyroid    Hernia    S/P cholecystectomy    H/O sinus surgery      FAMILY HISTORY:    SOCIAL HISTORY:    Home Medications:    Review of Systems:  Unable to obtain ROS while on ventilator    T(F): 97.2 (21 @ 13:41), Max: 98.4 (21 @ 09:22)  HR: 76 (21 @ 13:50) (61 - 89)  BP: 129/61 (21 @ 13:31) (122/70 - 180/107)  RR: 18 (21 @ 13:41) (10 - 18)  SpO2: 94% (21 @ 13:50)  Wt(kg): --  Mode: AC/ CMV (Assist Control/ Continuous Mandatory Ventilation), RR (machine): 18, TV (machine): 550, FiO2: 70, PEEP: 5  CAPILLARY BLOOD GLUCOSE        I&O's Summary    2021 07:01  -  2021 14:21  --------------------------------------------------------  IN: 171.8 mL / OUT: 0 mL / NET: 171.8 mL        Physical Exam:     Gen: critically ill appearing, intubated  Neuro: sedated, arousable, follows commands  CVS: +S1S2  Resp: CTA  Abd: soft, NT, ND, laparascopic incisional site C/D/I  Ext: warm, dry, no edema    Meds:    furosemide   Injectable 20 milliGRAM(s) IV Push once           acetaminophen  IVPB .. 1000 milliGRAM(s) IV Intermittent once  HYDROmorphone  Injectable 0.5 milliGRAM(s) IV Push every 10 minutes PRN  ondansetron Injectable 4 milliGRAM(s) IV Push once PRN  propofol Infusion 20 MICROgram(s)/kG/Min IV Continuous <Continuous>                       chlorhexidine 4% Liquid 1 Application(s) Topical <User Schedule>                              12.8   12.01 )-----------( 109      ( 2021 02:54 )             37.6       04-11    140  |  112<H>  |  18  ----------------------------<  100<H>  3.6   |  23  |  1.00    Ca    8.7      2021 02:54    TPro  6.9  /  Alb  3.1<L>  /  TBili  0.6  /  DBili  x   /  AST  24  /  ALT  27  /  AlkPhos  117  04-11          PT/INR - ( 2021 10:17 )   PT: 12.7 sec;   INR: 1.09 ratio         PTT - ( 2021 10:17 )  PTT:51.2 sec  Urinalysis Basic - ( 2021 07:49 )    Color: Pale Yellow / Appearance: Clear / S.005 / pH: x  Gluc: x / Ketone: Negative  / Bili: Negative / Urobili: Negative   Blood: x / Protein: Negative / Nitrite: Negative   Leuk Esterase: Negative / RBC: x / WBC x   Sq Epi: x / Non Sq Epi: x / Bacteria: x            ABG - ( 2021 13:17 )  pH, Arterial: 7.13  pH, Blood: x     /  pCO2: 63    /  pO2: 73    / HCO3: ?16.6 / Base Excess: -7.7  /  SaO2: ?89.8             Radiology:  EXAM:  XR CHEST AP OR PA 1V                          EXAM:  XR CHEST PORTABLE IMMED 1V                            PROCEDURE DATE:  2021          INTERPRETATION:  TIME OF EXAM: 2021 at 3:12 AM.    CLINICAL INFORMATION: Abdominal pain.    COMPARISON:  AP chest x-ray and CT chest from May 4, 2011.    TECHNIQUE:   AP Portable chest x-ray.    INTERPRETATION:    The heart is not enlarged. The mediastinum is not accurately evaluated on this image. The mine are unremarkable.  The lungs are clear.  No pleural effusion or pneumothorax is noted.  No pneumoperitoneum is seen.    AP portable chest x-ray from 2021 at 1:51 PM:    CLINICAL INFORMATION: Status post hernia repair. Wheezing.    INTERPRETATION:    New ET tube with tip2.7 cm above the fanta.  Right lung is clear.  There is new pulmonary vascular redistribution/congestion on the left.  There is new left basilar is/retrocardiac opacity with obscuration of part of the left hemidiaphragm.  There is new platelike atelectasis in the left mid to lower lung.  No right pleural effusion. No pneumothorax.            IMPRESSION:  ET tube 2.7 cm above the fanta on most recent image.    New pulmonary vascular redistribution/congestion on the left on most recent image.    New left mid to lower lung platelike atelectasis.    New left basilar and retrocardiac opacity which may be due to a left pleural effusion with passive atelectasis, atelectasis of other cause, and/or pneumonia in the appropriate clinical context.            LIVIA DARDEN MD; Attending Radiologist  This document has been electronically signed. 2021  1:24PM    Problems  -Acute hypoxic/hypercapnic respiratory failure  -Pulmonary Edema  -Hernia    54 yo F pmhx ITP, seizures, HTN, Hypothyorid, depression admitted with incarcerated incisional hernia. POD#0 s/p laparoscopic repair incarcerated incisional hernia. Post-op course complicated by failed extubation with subsequent re-intubation due to acute hypoxic respiratory failure    -Propofol gtt while on mechanical ventilation. Pain control PRN. Resume home anti-epileptics  -s/p Decadron 10mg for possible airway edema  -ABG noted hypoxia/hypercapnia. RR increased to 18 and FiO2 increased to 60%. Current vent settings 18/550/60%/+5. Actively titrating for O2 sat >92%. Repeat ABG  -CXR concerning for pulmonary edema (possible negative pressure with initial extubation) Ordered for Lasix IVP. Monitor urine output  -Hx ITP; PLT at baseline per documentation. Hematology following   -DVT PPX: Heparin SC    Admit to ICU; discussed w/ Intensivist . Discussed w/ Surgical team and Anesthesia    Critical care time spent (mins): 39 minutes including time spent reviewing chart, ordering tests/labs, discussing with interdisciplinary team. Not including time spent performing procedures.

## 2021-04-11 NOTE — ASU DISCHARGE PLAN (ADULT/PEDIATRIC) - CARE PROVIDER_API CALL
Ethan Mina  SURGERY  [address], [id number]  [place] [id number]  Phone: [phone]  Fax: [phone]  Follow Up Time:

## 2021-04-11 NOTE — PROGRESS NOTE ADULT - SUBJECTIVE AND OBJECTIVE BOX
s/p general anesthesia with ETT  negative pressure pulmonary is possible etiology  of resp. compromise    pt monitored in intensive care in guarded condition    will follow

## 2021-04-11 NOTE — DISCHARGE NOTE PROVIDER - NSDCCPCAREPLAN_GEN_ALL_CORE_FT
PRINCIPAL DISCHARGE DIAGNOSIS  Diagnosis: Incarcerated incisional hernia  Assessment and Plan of Treatment:       SECONDARY DISCHARGE DIAGNOSES  Diagnosis: Postoperative acute respiratory failure  Assessment and Plan of Treatment:

## 2021-04-11 NOTE — PROGRESS NOTE ADULT - SUBJECTIVE AND OBJECTIVE BOX
Post Operative Note  Patient: KARLI CORDOVA 55y (1965) Female   MRN: [id number]  Location: [facility] ICU1 03A  Visit: 04-11-21 Inpatient  Date: 04-11-21 @ 16:48    Patient seen and examined at bedside.  She is s/p laparoscopic incisional hernia repair, complicated by acute hypoxic respiratory failure, requiring reintubation, ABG with hypoxia/hypercapnia, s/p dose of lasix for pulmonary edema on postop CXR.  Now extubated, on ventimask, patient remains somewhat lethargic, reports throat and abdominal soreness, no nausea/vomiting.    Objective:  Vitals: T(F): 96.8 (04-11-21 @ 15:20), Max: 98.4 (04-11-21 @ 09:22)  HR: 67 (04-11-21 @ 16:30)  BP: 90/50 (04-11-21 @ 16:30) (90/50 - 180/107)  RR: 28 (04-11-21 @ 16:30)  SpO2: 93% (04-11-21 @ 16:30)    In:   04-11-21 @ 07:01  -  04-11-21 @ 16:48  --------------------------------------------------------  IN: 312.3 mL    IV Fluids:     Out:   04-11-21 @ 07:01  -  04-11-21 @ 16:48  --------------------------------------------------------  OUT: 800 mL    EBL: 15cc    Voided Urine:   04-11-21 @ 07:01  -  04-11-21 @ 16:48  --------------------------------------------------------  OUT: 800 mL    Freeman Catheter: yes    PHYSICAL EXAM  GENERAL:  Well-nourished, well-developed female lying in bed in NAD on ventimask  HEENT:  Sclera white. Mucous membranes moist.  ABDOMEN:  Soft, nondistended, +mild incisional TTP.  Trocar sites remain well-approximated with dermabond. No rebound tenderness or guarding.  SKIN:  No jaundice, pallor, or cyanosis  NEURO:  A&O x 3    Medications: [Standing]  chlorhexidine 4% Liquid 1 Application(s) Topical <User Schedule>  dexAMETHasone  IVPB 10 milliGRAM(s) IV Intermittent once  escitalopram 20 milliGRAM(s) Oral daily  heparin   Injectable 5000 Unit(s) SubCutaneous every 12 hours  levothyroxine 175 MICROGram(s) Oral daily    Medications: [PRN]  chlorhexidine 4% Liquid 1 Application(s) Topical <User Schedule>  dexAMETHasone  IVPB 10 milliGRAM(s) IV Intermittent once  escitalopram 20 milliGRAM(s) Oral daily  heparin   Injectable 5000 Unit(s) SubCutaneous every 12 hours  levothyroxine 175 MICROGram(s) Oral daily    Labs:                        12.8   12.01 )-----------( 109      ( 11 Apr 2021 02:54 )             37.6     04-11    140  |  112<H>  |  18  ----------------------------<  100<H>  3.6   |  23  |  1.00    Ca    8.7      11 Apr 2021 02:54    TPro  6.9  /  Alb  3.1<L>  /  TBili  0.6  /  DBili  x   /  AST  24  /  ALT  27  /  AlkPhos  117  04-11    PT/INR - ( 11 Apr 2021 10:17 )   PT: 12.7 sec;   INR: 1.09 ratio  PTT - ( 11 Apr 2021 10:17 )  PTT:51.2 sec    Imaging:  No post-op imaging studies    Assessment:  55 year old female s/p laparoscopic incisional hernia repair, complicated by acute hypoxic respiratory failure, requiring reintubation, ABG with hypoxia/hypercapnia, s/p dose of lasix for pulmonary edema on postop CXR, now extubated, on ventimask.    Plan:  - Continue to wean off O2  - Incentive spirometry  - Pain control PRN  - SCDs  - Follow up AM labs  - Management per ICU team  - Will continue to monitor Post Operative Note  Patient: KARLI CORDOVA 55y (1965) Female   MRN: [id number]  Location: [facility] ICU1 03A  Visit: 04-11-21 Inpatient  Date: 04-11-21 @ 16:48    Patient seen and examined at bedside.  She is s/p laparoscopic incisional hernia repair, complicated by acute hypoxic respiratory failure, requiring reintubation, ABG with hypoxia/hypercapnia, s/p dose of lasix for pulmonary edema.  Now extubated, on ventimask, patient remains somewhat lethargic, reports throat and abdominal soreness, no nausea/vomiting.    Objective:  Vitals: T(F): 96.8 (04-11-21 @ 15:20), Max: 98.4 (04-11-21 @ 09:22)  HR: 67 (04-11-21 @ 16:30)  BP: 90/50 (04-11-21 @ 16:30) (90/50 - 180/107)  RR: 28 (04-11-21 @ 16:30)  SpO2: 93% (04-11-21 @ 16:30)    In:   04-11-21 @ 07:01  -  04-11-21 @ 16:48  --------------------------------------------------------  IN: 312.3 mL    IV Fluids:     Out:   04-11-21 @ 07:01  -  04-11-21 @ 16:48  --------------------------------------------------------  OUT: 800 mL    EBL: 15cc    Voided Urine:   04-11-21 @ 07:01  -  04-11-21 @ 16:48  --------------------------------------------------------  OUT: 800 mL    Freeman Catheter: yes    PHYSICAL EXAM  GENERAL:  Well-nourished, well-developed female lying in bed in NAD on ventimask  HEENT:  Sclera white. Mucous membranes moist.  ABDOMEN:  Soft, nondistended, +mild incisional TTP.  Trocar sites remain well-approximated with dermabond. No rebound tenderness or guarding.  SKIN:  No jaundice, pallor, or cyanosis  NEURO:  A&O x 3    Medications: [Standing]  chlorhexidine 4% Liquid 1 Application(s) Topical <User Schedule>  dexAMETHasone  IVPB 10 milliGRAM(s) IV Intermittent once  escitalopram 20 milliGRAM(s) Oral daily  heparin   Injectable 5000 Unit(s) SubCutaneous every 12 hours  levothyroxine 175 MICROGram(s) Oral daily    Medications: [PRN]  chlorhexidine 4% Liquid 1 Application(s) Topical <User Schedule>  dexAMETHasone  IVPB 10 milliGRAM(s) IV Intermittent once  escitalopram 20 milliGRAM(s) Oral daily  heparin   Injectable 5000 Unit(s) SubCutaneous every 12 hours  levothyroxine 175 MICROGram(s) Oral daily    Labs:                        12.8   12.01 )-----------( 109      ( 11 Apr 2021 02:54 )             37.6     04-11    140  |  112<H>  |  18  ----------------------------<  100<H>  3.6   |  23  |  1.00    Ca    8.7      11 Apr 2021 02:54    TPro  6.9  /  Alb  3.1<L>  /  TBili  0.6  /  DBili  x   /  AST  24  /  ALT  27  /  AlkPhos  117  04-11    PT/INR - ( 11 Apr 2021 10:17 )   PT: 12.7 sec;   INR: 1.09 ratio  PTT - ( 11 Apr 2021 10:17 )  PTT:51.2 sec    Imaging:  < from: Xray Chest 1 View AP/PA (04.11.21 @ 13:11) >  IMPRESSION:  ET tube 2.7 cm above the fanta on most recent image.  New pulmonary vascular redistribution/congestion on the left on most recent image.  New left mid to lower lung platelike atelectasis.  New left basilar and retrocardiac opacity which may be due to a left pleural effusion with passive atelectasis, atelectasis of other cause, and/or pneumonia in the appropriate clinical context.    Assessment:  55 year old female s/p laparoscopic incisional hernia repair, complicated by acute hypoxic respiratory failure, requiring reintubation, ABG with hypoxia/hypercapnia, s/p dose of lasix for pulmonary edema, now extubated, on ventimask.    Plan:  - Continue to wean off O2  - Incentive spirometry  - Pain control PRN  - SCDs  - Follow up AM labs  - Management per ICU team  - Will continue to monitor Post Operative Note  Patient: KARLI CORDOVA 55y (1965) Female   MRN: [id number]  Location: [facility] ICU1 03A  Visit: 04-11-21 Inpatient  Date: 04-11-21 @ 16:48    Patient seen and examined at bedside.  She is s/p laparoscopic incisional hernia repair, complicated by acute hypoxic respiratory failure, requiring reintubation, ABG with hypoxia/hypercapnia, s/p dose of lasix for pulmonary edema.  Now extubated, on ventimask, patient remains somewhat lethargic, reports throat and abdominal soreness, no nausea/vomiting.    Objective:  Vitals: T(F): 96.8 (04-11-21 @ 15:20), Max: 98.4 (04-11-21 @ 09:22)  HR: 67 (04-11-21 @ 16:30)  BP: 90/50 (04-11-21 @ 16:30) (90/50 - 180/107)  RR: 28 (04-11-21 @ 16:30)  SpO2: 93% (04-11-21 @ 16:30)    In:   04-11-21 @ 07:01  -  04-11-21 @ 16:48  --------------------------------------------------------  IN: 312.3 mL    IV Fluids:     Out:   04-11-21 @ 07:01  -  04-11-21 @ 16:48  --------------------------------------------------------  OUT: 800 mL    EBL: 15cc    Voided Urine:   04-11-21 @ 07:01  -  04-11-21 @ 16:48  --------------------------------------------------------  OUT: 800 mL    Freeman Catheter: yes    PHYSICAL EXAM  GENERAL:  Well-nourished, well-developed female lying in bed in NAD on ventimask  HEENT:  Sclera white. Mucous membranes moist.  ABDOMEN:  Soft, nondistended, +mild incisional TTP.  Trocar sites remain well-approximated with dermabond. No rebound tenderness or guarding.  SKIN:  No jaundice, pallor, or cyanosis  NEURO:  A&O x 3    Medications: [Standing]  chlorhexidine 4% Liquid 1 Application(s) Topical <User Schedule>  dexAMETHasone  IVPB 10 milliGRAM(s) IV Intermittent once  escitalopram 20 milliGRAM(s) Oral daily  heparin   Injectable 5000 Unit(s) SubCutaneous every 12 hours  levothyroxine 175 MICROGram(s) Oral daily    Medications: [PRN]  chlorhexidine 4% Liquid 1 Application(s) Topical <User Schedule>  dexAMETHasone  IVPB 10 milliGRAM(s) IV Intermittent once  escitalopram 20 milliGRAM(s) Oral daily  heparin   Injectable 5000 Unit(s) SubCutaneous every 12 hours  levothyroxine 175 MICROGram(s) Oral daily    Labs:                        12.8   12.01 )-----------( 109      ( 11 Apr 2021 02:54 )             37.6     04-11    140  |  112<H>  |  18  ----------------------------<  100<H>  3.6   |  23  |  1.00    Ca    8.7      11 Apr 2021 02:54    TPro  6.9  /  Alb  3.1<L>  /  TBili  0.6  /  DBili  x   /  AST  24  /  ALT  27  /  AlkPhos  117  04-11    PT/INR - ( 11 Apr 2021 10:17 )   PT: 12.7 sec;   INR: 1.09 ratio  PTT - ( 11 Apr 2021 10:17 )  PTT:51.2 sec    Imaging:  < from: Xray Chest 1 View AP/PA (04.11.21 @ 13:11) >  IMPRESSION:  ET tube 2.7 cm above the fanta on most recent image.  New pulmonary vascular redistribution/congestion on the left on most recent image.  New left mid to lower lung platelike atelectasis.  New left basilar and retrocardiac opacity which may be due to a left pleural effusion with passive atelectasis, atelectasis of other cause, and/or pneumonia in the appropriate clinical context.    Assessment:  55 year old female s/p laparoscopic incisional hernia repair, complicated by acute hypoxic respiratory failure, requiring reintubation, ABG with hypoxia/hypercapnia, s/p dose of lasix for pulmonary edema, now extubated, on ventimask.    Plan:  - Continue to wean off O2 as medically indicated  - Supportive care, Incentive spirometry, pain control PRN  - SCDs  - Follow up AM labs  - Management per ICU team  - Will continue to monitor  - Possible d/c in AM if continues to improve Post Operative Note  Patient: KARLI CODROVA 55y (1965) Female   MRN: [id number]  Location: [facility] ICU1 03A  Visit: 04-11-21 Inpatient  Date: 04-11-21 @ 16:48    Patient seen and examined at bedside.  She is s/p laparoscopic incisional hernia repair, complicated by acute hypoxic respiratory failure, requiring reintubation, ABG with hypoxia/hypercapnia, s/p dose of lasix for pulmonary edema.  Now extubated, on ventimask, patient remains somewhat lethargic, reports throat and abdominal soreness, no nausea/vomiting.    Objective:  Vitals: T(F): 96.8 (04-11-21 @ 15:20), Max: 98.4 (04-11-21 @ 09:22)  HR: 67 (04-11-21 @ 16:30)  BP: 90/50 (04-11-21 @ 16:30) (90/50 - 180/107)  RR: 28 (04-11-21 @ 16:30)  SpO2: 93% (04-11-21 @ 16:30)    In:   04-11-21 @ 07:01  -  04-11-21 @ 16:48  --------------------------------------------------------  IN: 312.3 mL    IV Fluids:     Out:   04-11-21 @ 07:01  -  04-11-21 @ 16:48  --------------------------------------------------------  OUT: 800 mL    EBL: 15cc    Voided Urine:   04-11-21 @ 07:01  -  04-11-21 @ 16:48  --------------------------------------------------------  OUT: 800 mL    Freeman Catheter: no    PHYSICAL EXAM  GENERAL:  Well-nourished, well-developed female lying in bed in NAD on ventimask  HEENT:  Sclera white. Mucous membranes moist.  ABDOMEN:  Soft, nondistended, +mild incisional TTP.  Trocar sites remain well-approximated with dermabond. No rebound tenderness or guarding.  : External catheter in place with 300cc clear yellow urine in cannister  SKIN:  No jaundice, pallor, or cyanosis  NEURO:  A&O x 3    Medications: [Standing]  chlorhexidine 4% Liquid 1 Application(s) Topical <User Schedule>  dexAMETHasone  IVPB 10 milliGRAM(s) IV Intermittent once  escitalopram 20 milliGRAM(s) Oral daily  heparin   Injectable 5000 Unit(s) SubCutaneous every 12 hours  levothyroxine 175 MICROGram(s) Oral daily    Medications: [PRN]  chlorhexidine 4% Liquid 1 Application(s) Topical <User Schedule>  dexAMETHasone  IVPB 10 milliGRAM(s) IV Intermittent once  escitalopram 20 milliGRAM(s) Oral daily  heparin   Injectable 5000 Unit(s) SubCutaneous every 12 hours  levothyroxine 175 MICROGram(s) Oral daily    Labs:                        12.8   12.01 )-----------( 109      ( 11 Apr 2021 02:54 )             37.6     04-11    140  |  112<H>  |  18  ----------------------------<  100<H>  3.6   |  23  |  1.00    Ca    8.7      11 Apr 2021 02:54    TPro  6.9  /  Alb  3.1<L>  /  TBili  0.6  /  DBili  x   /  AST  24  /  ALT  27  /  AlkPhos  117  04-11    PT/INR - ( 11 Apr 2021 10:17 )   PT: 12.7 sec;   INR: 1.09 ratio  PTT - ( 11 Apr 2021 10:17 )  PTT:51.2 sec    Imaging:  < from: Xray Chest 1 View AP/PA (04.11.21 @ 13:11) >  IMPRESSION:  ET tube 2.7 cm above the fanta on most recent image.  New pulmonary vascular redistribution/congestion on the left on most recent image.  New left mid to lower lung platelike atelectasis.  New left basilar and retrocardiac opacity which may be due to a left pleural effusion with passive atelectasis, atelectasis of other cause, and/or pneumonia in the appropriate clinical context.    Assessment:  55 year old female s/p laparoscopic incisional hernia repair, complicated by acute hypoxic respiratory failure, requiring reintubation, ABG with hypoxia/hypercapnia, s/p dose of lasix for pulmonary edema, now extubated, on ventimask.    Plan:  - Continue to wean off O2 as medically indicated  - Supportive care, Incentive spirometry, pain control PRN  - SCDs  - Follow up AM labs  - Management per ICU team  - Will continue to monitor  - Possible d/c in AM if continues to improve

## 2021-04-11 NOTE — H&P ADULT - ASSESSMENT
55 year old female smoker with PMH ITP, seizures, HTN, hypothyroid, depression, hx CCY 25 years ago with incarcerated incisional hernia.    - NPO, IV fluids, pain control PRN  - Medicine & hem/onc called for consult/clearance  - OR for laparoscopic incisional hernia repair  - Discussed with Dr. Mina

## 2021-04-11 NOTE — DISCHARGE NOTE PROVIDER - NSDCFUADDINST_GEN_ALL_CORE_FT
No heavy lifting over 20 lbs.  Apply water proof ice pack 20 mins on, 20 mins off to help decrease pain and swelling. You may begin showering as usual but Do NOT rub or peel off skin glue.  Do not scrub or soak incision site. Pat dry. NO tub baths, NO swimming pools. No hot tubs.  You may take over-the-counter pain medication such as tylenol or motrin as directed as needed for pain.  A prescription for percocet has been sent to your pharmacy to take as needed for any pain NOT relieved with over-the-counter pain medication.  *NOTE: Tylenol in percocet.  Maximum daily dose of tylenol NOT to exceed 4,000mg.  Do NOT take percocet and tylenol together at the same time.   You may take an over-the-counter stool softener such as colace as needed for any constipation while taking percocet for pain.  DO NOT DRIVE WHILE TAKING PERCOCET FOR PAIN.

## 2021-04-11 NOTE — CONSULT NOTE ADULT - ATTENDING COMMENTS
56 yo woman with Hx ITP, seizure, htn, depression POD 0 s/p lap repair of incarcerated incisional hernia, in PACU reintubated for stridor with possible negative pressure pulmonary edema, and was transferred to ICU for further management.  She received decadron 8mg (postop for nausea) and lasix with brisk UOP.  She was sedated on propfol and FiO2 weaned down to 40%.  This afternoon she tolerated a PS trial of 5/5 with rr low 20s and TV 400s-600s.  She was awake and following commands while on propofol.  She was extubated with anesthesia at bedside, and able to speak immediately afterwards.  She has intermittent expiratory stridor.  Gave racemic epi and will continue decadron for 24h. Diurese as needed based on UOP.  Discussed with surgery and anesthesia.  Family updated by ICU PA.

## 2021-04-11 NOTE — ED PROVIDER NOTE - CLINICAL SUMMARY MEDICAL DECISION MAKING FREE TEXT BOX
h/o cholecystectomy 25 years ago C/C severe increasing upper abdominal pain x 2 days with nausea, no vomiting  ivf labs ct scan and analgesia

## 2021-04-11 NOTE — CONSULT NOTE ADULT - SUBJECTIVE AND OBJECTIVE BOX
[facility]         Felipe Hansen, Rachael, Pieter, Davey, Brodie, Renee        [phone] (office)    CHIEF COMPLAINT: Patient is a 55y old  Female who presents with a chief complaint of Hernia Surgery (2021 09:42)      HPI:  55f 1/2 ppd smoker with PMH ITP, seizures, HTN, hypothyroid, depression, hx leonidas 25 years ago with subsequent incisional hernia x 10 years, presents with 6/10 supraumbilical abdominal pain around hernia which started 2 nights ago, subsided yesterday but then returned last night, with mild associated nausea but no vomiting.  Hernia had never caused problems in the past.  Patient states she is not passing flatus; last BM this AM was loose.  Patient with hx of ITP diagnosed 1 year ago; had been seeing hem/onc Dr. Evangelist Pena (recently ), last saw PA in office in 2021 and was told PLT count ok for surgery.  PLT count here 109, which patient states is similar to here previous count.  Has drunk 1 bottle of water doa; last meal was soup for dinner last night.  Patient denies any fever, chills, chest pain, shortness of breath, vomiting, melena, hematochezia.      At baseline she is sedentary.  She does not report sxs suggestive of angina, hf or arrhythmia.    She is planned for urgent repair of incarcerated hernia    PAST MEDICAL & SURGICAL HISTORY:  Idiopathic thrombocytopenic purpura (ITP)    Seizures    HTN (hypertension)    Hypothyroid    Hernia    S/P cholecystectomy    H/O sinus surgery        SOCIAL HISTORY: min alcohol, no illicit drug use    FAMILY HISTORY:   No pertinent family history of CAD  fh etoh    Outpatient medications:  losrtan hct 50-12.5, unithroid, lexapro, trokendi    MEDICATIONS  (STANDING):  ceFAZolin   IVPB 2000 milliGRAM(s) IV Intermittent once  sodium chloride 0.9%. 1000 milliLiter(s) (100 mL/Hr) IV Continuous <Continuous>    MEDICATIONS  (PRN):  HYDROmorphone  Injectable 0.5 milliGRAM(s) IV Push every 4 hours PRN Moderate Pain (4 - 6)  HYDROmorphone  Injectable 1 milliGRAM(s) IV Push every 4 hours PRN Severe Pain (7 - 10)      Allergies    No Known Allergies    Intolerances        REVIEW OF SYSTEMS: Is negative for eye, ENT, GI, , allergic, dermatologic, musculoskeletal and neurologic, except as described above.    VITAL SIGNS:   Vital Signs Last 24 Hrs  T(C): 36.9 (2021 09:22), Max: 36.9 (2021 09:22)  T(F): 98.4 (2021 09:22), Max: 98.4 (2021 09:22)  HR: 70 (:22) (68 - 76)  BP: 128/66 (2021 09:22) (122/70 - 180/107)  BP(mean): --  RR: 16 (:) (16 - 16)  SpO2: 97% (:) (97% - 99%)    I&O's Summary      PHYSICAL EXAM:    Constitutional: NAD, awake and alert, well-developed  Eyes:  EOMI, no oral cyanosis, conjunctivae clear, anicteric.  Pulmonary: Non-labored, breath sounds are clear bilaterally, no wheezing, rales or rhonchi  Cardiovascular:  regular S1 and S2. No murmur.  No rubs, gallops or clicks  Gastrointestinal: Bowel Sounds present, soft, mildly tender.   Lymph: No peripheral edema.   Neurological: Alert, strength and sensitivity are grossly intact  Skin: No obvious lesions/rashes.   Psych:  Mood & affect appropriate .    LABS: All Labs Reviewed:                        12.8    )-----------( 109      ( 2021 02:54 )             37.6     2021 02:54    140    |  112    |  18     ----------------------------<  100    3.6     |  23     |  1.00     Ca    8.7        2021 02:54    TPro  6.9    /  Alb  3.1    /  TBili  0.6    /  DBili  x      /  AST  24     /  ALT  27     /  AlkPhos  117    2021 02:54          Blood Culture:         RADIOLOGY:  < from: CT Abdomen and Pelvis w/ Oral Cont and w/ IV Cont (21 @ 06:34) >    EXAM:  CT ABDOMEN AND PELVIS OC IC                            PROCEDURE DATE:  2021          INTERPRETATION:  CLINICAL INFORMATION: Upper abdominal pain.    COMPARISON: 2011.    CONTRAST/COMPLICATIONS:  IV Contrast: Omnipaque 350  90 cc administered   10 cc discarded  Oral Contrast: Omnipaque 300  Complications: None reported at time of study completion    PROCEDURE:  CT of the Abdomen and Pelvis was performed.  Sagittal and coronal reformats were performed.    FINDINGS:  LOWER CHEST:Right basilar dependent and platelike atelectasis.    LIVER: Within normal limits.  BILE DUCTS: Normal caliber.  GALLBLADDER: Cholecystectomy.  SPLEEN: Within normal limits.  PANCREAS: Within normal limits.  ADRENALS: Left adrenal nodule measuring 1.7 cm and right adrenal nodule measuring up to 1.1 cm.  KIDNEYS/URETERS: Kidneys enhance symmetrically without hydronephrosis or focal renal parenchymal lesion.    BLADDER: Within normal limits.  REPRODUCTIVE ORGANS: Uterus and adnexa are unremarkable.    BOWEL: No bowel obstruction or overt bowel wall thickening. Appendix is normal.  PERITONEUM: No ascites, pneumoperitoneum, or loculated collection. No mesenteric lymphadenopathy.  VESSELS: Mild atherosclerotic calcification of the aortoiliac tree.Normal caliber abdominal aorta.  RETROPERITONEUM/LYMPH NODES: No lymphadenopathy.  ABDOMINAL WALL: Small fat-containing midline supraumbilical ventral abdominal wall hernia with slight infiltration of the fat in the hernia sac.  BONES: Mild degenerative changes of the spine.    IMPRESSION:  Small fat-containing midline umbilical ventral abdominal wall hernia with slight infiltration of the fat in the hernia sac which is new compared with prior exam and may represent incarcerated mesenteric fat.    No bowel obstruction or evidence of bowel inflammation.    Bilateral adrenal nodules, statistically adenomas; nonemergent MRI of the abdomen may be considered for better characterization.            IESHA MOLINA DO; Attending Radiologist  This document has been electronically signed. 2021  7:00AM    < end of copied text >    EKG: sb

## 2021-04-11 NOTE — PROGRESS NOTE ADULT - SUBJECTIVE AND OBJECTIVE BOX
pt uneventfully extubated  treat with racemic epinephrine for upper airway stridor    monitor in ICU     D/C in AM    pt gave history of recent diagnosis of TMJ and severe DOROTA. Advised work up following discharge

## 2021-04-11 NOTE — ED ADULT NURSE NOTE - PMH
Hernia    HTN (hypertension)    Hypothyroid    Idiopathic thrombocytopenic purpura (ITP)    Seizures

## 2021-04-11 NOTE — ED ADULT NURSE REASSESSMENT NOTE - NS ED NURSE REASSESS COMMENT FT1
0200 Seen by dr Cadet orders obtained  0235 IV access inserted as per protocol, blod obtained & sent to lab, Medicated with Morphine Sulphate 4 mg IVp as prescribed pain scale 7/10  0239 Zofran 4mg ivp administered as prescribed.  0240 Protonix mixed as per package administered IVP. IV fluid 0.9Ns in progress. Monitored

## 2021-04-11 NOTE — DISCHARGE NOTE PROVIDER - NSDCFUADDAPPT_GEN_ALL_CORE_FT
Follow up with Dr. Mina within 1 week of discharge.  Call the office to schedule an appointment. Follow up with Dr. Mina within 1 week of discharge.  Call the office to schedule an appointment.  You should also follow up with your primary care doctor at your earliest convenience for further evaluation of bilateral adrenal nodules on CT.

## 2021-04-11 NOTE — ASU DISCHARGE PLAN (ADULT/PEDIATRIC) - ASU DC SPECIAL INSTRUCTIONSFT
Keep glue clean, dry and intact x 24 hrs. Apply water proof ice pack 20 mins on, 20 mins off to help decrease pain and swelling. You may begin showering as usual but Do NOT pick glue. Do not scrub or soak incision site. Pat dry. NO tub baths, NO swimming pools. No hot tubs.  Do not lift anything over 10 lbs.  Take frequent walks.  You may take over the counter stool softener such as colace as needed for constipation while taking pain medication.  Take over the counter Tylenol or Motrin/Ibuprofen as needed for  mild/moderate pain. DO NOT take tylenol while taking percocet.  DO NOT DRIVE WHILE TAKING NARCOTIC PAIN MEDICATION.

## 2021-04-11 NOTE — CONSULT NOTE ADULT - ASSESSMENT
54 y/o woman w hx ITP(dx'd 1/2020, under care Curtis Blanca and John Moore, Rx'd w steroids w effect, off [id number], last 3-4mon baseline plts 107k-109k), seizures, HTN, hypothyroid, depression.  Adm w symptomatic incarcerated ventral incisional hernia, for emergent surgery. CBC plts 109K, needing Heme clearance  Pt denies incr or abnormal bleed/bruise, no recent use of ASA/NSAIDs/vit E/fish oil  Never had bleeding complications w previous surgeries/invasive procedures.    -present platelets adequate for hemostasis and the planned emergent hernia repair  -no special precautions  -can maintain usual DVT/thromboembolism prophylactic therapy for this type of surgery  -follow serial CBC    discussed w pt, surgery, medicine    thank you, will follow w you

## 2021-04-11 NOTE — BRIEF OPERATIVE NOTE - NSICDXBRIEFPROCEDURE_GEN_ALL_CORE_FT
PROCEDURES:  Laparoscopic repair of incarcerated incisional hernia 11-Apr-2021 12:04:16  Ethan Mina

## 2021-04-11 NOTE — H&P ADULT - NSHPPHYSICALEXAM_GEN_ALL_CORE
GENERAL:  Well-nourished, well-developed female lying comfortably in bed in NAD  HEENT:  Sclera white. Mucous membranes moist.  CARDIO:  Regular rate and rhythm.  No murmur, gallop or rub appreciated.  RESPIRATORY:  Clear to auscultation bilaterally.  No wheezing, rales or rhonchi appreciated.  ABDOMEN:  Soft, nondistended, +palpable supraumbilical bulge upon standing, nonreducible.  Laparoscopic CCY trocar scars present.  BS appreciated on auscultation.  No rebound tenderness or guarding.  SKIN:  No jaundice, pallor, or cyanosis  NEURO:  A&O x 3

## 2021-04-12 ENCOUNTER — TRANSCRIPTION ENCOUNTER (OUTPATIENT)
Age: 56
End: 2021-04-12

## 2021-04-12 VITALS
RESPIRATION RATE: 32 BRPM | HEART RATE: 70 BPM | OXYGEN SATURATION: 91 % | SYSTOLIC BLOOD PRESSURE: 149 MMHG | DIASTOLIC BLOOD PRESSURE: 72 MMHG

## 2021-04-12 LAB
ANION GAP SERPL CALC-SCNC: 9 MMOL/L — SIGNIFICANT CHANGE UP (ref 5–17)
BASE EXCESS BLDA CALC-SCNC: -4.1 MMOL/L — LOW (ref -2–2)
BLOOD GAS COMMENTS ARTERIAL: SIGNIFICANT CHANGE UP
BLOOD GAS COMMENTS ARTERIAL: SIGNIFICANT CHANGE UP
BUN SERPL-MCNC: 16 MG/DL — SIGNIFICANT CHANGE UP (ref 7–23)
CALCIUM SERPL-MCNC: 8.8 MG/DL — SIGNIFICANT CHANGE UP (ref 8.5–10.1)
CHLORIDE SERPL-SCNC: 111 MMOL/L — HIGH (ref 96–108)
CO2 SERPL-SCNC: 22 MMOL/L — SIGNIFICANT CHANGE UP (ref 22–31)
COVID-19 SPIKE DOMAIN AB INTERP: POSITIVE
COVID-19 SPIKE DOMAIN ANTIBODY RESULT: >250 U/ML — HIGH
CREAT SERPL-MCNC: 0.9 MG/DL — SIGNIFICANT CHANGE UP (ref 0.5–1.3)
GLUCOSE SERPL-MCNC: 96 MG/DL — SIGNIFICANT CHANGE UP (ref 70–99)
HCO3 BLDA-SCNC: 22 MMOL/L — LOW (ref 23–27)
HCT VFR BLD CALC: 35.7 % — SIGNIFICANT CHANGE UP (ref 34.5–45)
HGB BLD-MCNC: 12.3 G/DL — SIGNIFICANT CHANGE UP (ref 11.5–15.5)
HOROWITZ INDEX BLDA+IHG-RTO: 21 — SIGNIFICANT CHANGE UP
MCHC RBC-ENTMCNC: 29.4 PG — SIGNIFICANT CHANGE UP (ref 27–34)
MCHC RBC-ENTMCNC: 34.5 GM/DL — SIGNIFICANT CHANGE UP (ref 32–36)
MCV RBC AUTO: 85.4 FL — SIGNIFICANT CHANGE UP (ref 80–100)
NRBC # BLD: 0 /100 WBCS — SIGNIFICANT CHANGE UP (ref 0–0)
PCO2 BLDA: 28 MMHG — LOW (ref 32–46)
PH BLDA: 7.45 — SIGNIFICANT CHANGE UP (ref 7.35–7.45)
PLATELET # BLD AUTO: 112 K/UL — LOW (ref 150–400)
PO2 BLDA: 57 MMHG — LOW (ref 74–108)
POTASSIUM SERPL-MCNC: 4.1 MMOL/L — SIGNIFICANT CHANGE UP (ref 3.5–5.3)
POTASSIUM SERPL-SCNC: 4.1 MMOL/L — SIGNIFICANT CHANGE UP (ref 3.5–5.3)
RBC # BLD: 4.18 M/UL — SIGNIFICANT CHANGE UP (ref 3.8–5.2)
RBC # FLD: 13 % — SIGNIFICANT CHANGE UP (ref 10.3–14.5)
SAO2 % BLDA: 92 % — SIGNIFICANT CHANGE UP (ref 92–96)
SARS-COV-2 IGG+IGM SERPL QL IA: >250 U/ML — HIGH
SARS-COV-2 IGG+IGM SERPL QL IA: POSITIVE
SODIUM SERPL-SCNC: 142 MMOL/L — SIGNIFICANT CHANGE UP (ref 135–145)
WBC # BLD: 16.32 K/UL — HIGH (ref 3.8–10.5)
WBC # FLD AUTO: 16.32 K/UL — HIGH (ref 3.8–10.5)

## 2021-04-12 PROCEDURE — 85730 THROMBOPLASTIN TIME PARTIAL: CPT

## 2021-04-12 PROCEDURE — 80048 BASIC METABOLIC PNL TOTAL CA: CPT

## 2021-04-12 PROCEDURE — 99233 SBSQ HOSP IP/OBS HIGH 50: CPT

## 2021-04-12 PROCEDURE — 85027 COMPLETE CBC AUTOMATED: CPT

## 2021-04-12 PROCEDURE — 94640 AIRWAY INHALATION TREATMENT: CPT

## 2021-04-12 PROCEDURE — 86769 SARS-COV-2 COVID-19 ANTIBODY: CPT

## 2021-04-12 PROCEDURE — 84702 CHORIONIC GONADOTROPIN TEST: CPT

## 2021-04-12 PROCEDURE — 99285 EMERGENCY DEPT VISIT HI MDM: CPT | Mod: 25

## 2021-04-12 PROCEDURE — 93005 ELECTROCARDIOGRAM TRACING: CPT

## 2021-04-12 PROCEDURE — C1889: CPT

## 2021-04-12 PROCEDURE — 74177 CT ABD & PELVIS W/CONTRAST: CPT

## 2021-04-12 PROCEDURE — 96374 THER/PROPH/DIAG INJ IV PUSH: CPT | Mod: XU

## 2021-04-12 PROCEDURE — 71045 X-RAY EXAM CHEST 1 VIEW: CPT

## 2021-04-12 PROCEDURE — 36415 COLL VENOUS BLD VENIPUNCTURE: CPT

## 2021-04-12 PROCEDURE — 86850 RBC ANTIBODY SCREEN: CPT

## 2021-04-12 PROCEDURE — 83690 ASSAY OF LIPASE: CPT

## 2021-04-12 PROCEDURE — 86901 BLOOD TYPING SEROLOGIC RH(D): CPT

## 2021-04-12 PROCEDURE — 86900 BLOOD TYPING SEROLOGIC ABO: CPT

## 2021-04-12 PROCEDURE — 87635 SARS-COV-2 COVID-19 AMP PRB: CPT

## 2021-04-12 PROCEDURE — 85610 PROTHROMBIN TIME: CPT

## 2021-04-12 PROCEDURE — 99232 SBSQ HOSP IP/OBS MODERATE 35: CPT

## 2021-04-12 PROCEDURE — 80053 COMPREHEN METABOLIC PANEL: CPT

## 2021-04-12 PROCEDURE — 82803 BLOOD GASES ANY COMBINATION: CPT

## 2021-04-12 PROCEDURE — C1781: CPT

## 2021-04-12 PROCEDURE — 81003 URINALYSIS AUTO W/O SCOPE: CPT

## 2021-04-12 PROCEDURE — 96375 TX/PRO/DX INJ NEW DRUG ADDON: CPT

## 2021-04-12 PROCEDURE — 82150 ASSAY OF AMYLASE: CPT

## 2021-04-12 PROCEDURE — 88302 TISSUE EXAM BY PATHOLOGIST: CPT

## 2021-04-12 PROCEDURE — 96361 HYDRATE IV INFUSION ADD-ON: CPT

## 2021-04-12 RX ORDER — IBUPROFEN 200 MG
1 TABLET ORAL
Qty: 0 | Refills: 0 | DISCHARGE

## 2021-04-12 RX ORDER — OXYCODONE AND ACETAMINOPHEN 5; 325 MG/1; MG/1
1 TABLET ORAL
Qty: 20 | Refills: 0
Start: 2021-04-12

## 2021-04-12 RX ORDER — MORPHINE SULFATE 50 MG/1
2 CAPSULE, EXTENDED RELEASE ORAL ONCE
Refills: 0 | Status: DISCONTINUED | OUTPATIENT
Start: 2021-04-12 | End: 2021-04-12

## 2021-04-12 RX ORDER — LEVOTHYROXINE SODIUM 125 MCG
1 TABLET ORAL
Qty: 0 | Refills: 0 | DISCHARGE

## 2021-04-12 RX ORDER — TOPIRAMATE 25 MG
1 TABLET ORAL
Qty: 0 | Refills: 0 | DISCHARGE

## 2021-04-12 RX ORDER — LOSARTAN/HYDROCHLOROTHIAZIDE 100MG-25MG
1 TABLET ORAL
Qty: 0 | Refills: 0 | DISCHARGE

## 2021-04-12 RX ORDER — ESCITALOPRAM OXALATE 10 MG/1
1 TABLET, FILM COATED ORAL
Qty: 0 | Refills: 0 | DISCHARGE

## 2021-04-12 RX ADMIN — Medication 102 MILLIGRAM(S): at 02:40

## 2021-04-12 RX ADMIN — Medication 175 MICROGRAM(S): at 05:28

## 2021-04-12 RX ADMIN — Medication 102 MILLIGRAM(S): at 09:59

## 2021-04-12 RX ADMIN — Medication 100 MILLIGRAM(S): at 11:54

## 2021-04-12 RX ADMIN — MORPHINE SULFATE 2 MILLIGRAM(S): 50 CAPSULE, EXTENDED RELEASE ORAL at 00:47

## 2021-04-12 RX ADMIN — MORPHINE SULFATE 2 MILLIGRAM(S): 50 CAPSULE, EXTENDED RELEASE ORAL at 01:03

## 2021-04-12 RX ADMIN — ESCITALOPRAM OXALATE 20 MILLIGRAM(S): 10 TABLET, FILM COATED ORAL at 11:54

## 2021-04-12 NOTE — PROGRESS NOTE ADULT - ASSESSMENT
55 year old female smoker with PMH ITP, seizures, HTN, hypothyroid, depression, hx CCY 25 years ago with incarcerated incisional hernia. Admitted to surgical service under Dr. Mina s/p repair incarcerated hernia s/p acute respiratory failure requiring intubation and ICU.  Extubated 4/11 on RA    - Incarcerated hernia  - POD #1 s/p repair of hernia by Dr. Mina c/b Acute respiratory failure   - Post operative pain well managed without medications  - Resume diet and discharge planning  as per surgery     Acute respiratory failure 2/2 stidor  -  requiring brief reintubation post op and diuresis with lasix IV.  Extubated 4/11 to RA  - VSS on RA.     -HTN:   Chronic, home meds include Losartan/ HCTZ 50/12.5mg oral daily.   - home oral meds on hold post surgery . BP stable and well controlled   - Further management per ICU team    -Hypothyroidism:   Takes Unithyroid 175 mcg oral daily     -Seizure/Epilepsy:  Chronic, Has been seizure free on medication. Continue home dose Trokendi XR (Topiranate)  50mg Cap, 2 caps daily    -ITP:   Chronic s/p steroid treatment outpatient hematologist (Dr. John Pena)  Platelet count is 109, stable s/p surgery.  No s/p of bleeding, not on any medications.  Continue with DVT ppx while hospitalized     -Depression:   Chronic, stable  takes escitalopram 20mg oral daily    - Current Tobacco Use:   Smoking cessation education provided. Nicotine Patch offered, patient did not accept. Would recommend to order 21mg daily.    -Sinus bradycardia on EKG. Telemonitor shows NSR. Patient asymptomatic

## 2021-04-12 NOTE — DISCHARGE NOTE NURSING/CASE MANAGEMENT/SOCIAL WORK - PATIENT PORTAL LINK FT
You can access the FollowMyHealth Patient Portal offered by [facility] by registering at the following website: [email or web address]. By joining [facility]’s FollowMyHealth portal, you will also be able to view your health information using other applications (apps) compatible with our system.

## 2021-04-12 NOTE — PROGRESS NOTE ADULT - SUBJECTIVE AND OBJECTIVE BOX
55y Female s/p hernia repair with GA. Failed extubation after surgery and was reintubated yesterday. Extubated successfully a few hours later. Today sitting in chair resting comfortably on room air    T(C): 36.9 (04-12-21 @ 07:44), Max: 37 (04-11-21 @ 19:42)  HR: 75 (04-12-21 @ 07:35) (55 - 89)  BP: 143/71 (04-12-21 @ 07:00) (90/50 - 177/83)  RR: 17 (04-12-21 @ 07:00) (10 - 38)  SpO2: 91% (04-12-21 @ 07:00) (89% - 98%)  Wt(kg): --    Pt  doing well, no anesthesia complications or complaints noted or reported.   No additional recommendations.

## 2021-04-12 NOTE — PROGRESS NOTE ADULT - ATTENDING COMMENTS
55F PMH ITP, Seizures, HTN, hypothyroidism, and Depression presents with incarcerated incisional hernia s/p lap repair, post-op course complicated by stridor with negative pressure pulmonary edema requiring re-intubation. Now improved with steroids and diuresis. Extubated 4/11. Currently with resolved hypercapnia and resolving hypoxemia.    1. Neuro: continue home topiramate and escitalopram  2. CV: HD stable, restart home BP meds  3. Pulm: resolved hypercapnia, has mild hypoxemia to 92% with high A-a gradient due to atelectasis. No hypoxemia with ambulation. Does not require home oxygen. Incentive spirometry. Short course prednisone taper  4. GI: advance diet as tolerates, incisional site is c/d/i  5. Renal: stable kidney function and lytes  6. ID: no evidence of infection, observe off abx  7. Heme: SCD's and ambulation for DVT ppx, plan for d/c home today  8. Endo: continue synthroid  9. Skin: no lines or ortega  10. Dispo: full code, discussed with patient at bedside, stable for d/c home today

## 2021-04-12 NOTE — PROGRESS NOTE ADULT - ASSESSMENT
55f 1/2 ppd smoker with PMH ITP, seizures, HTN, hypothyroid, depression, hx leonidas 25 years ago with subsequent incisional hernia x 10 years, presents with 6/10 supraumbilical abdominal pain around hernia.  She is now s/p repair of incarcerated hernia, with post op hypoxic resp failure requiring re-inubation.  She was successfully extubated yesterday and is now on ra.    -there is no evidence of acute ischemia.  -there is NO imi on ekg    -there is no evidence of significant arrhythmia.  -remains in a sr on tele    -there is no evidence for meaningful  volume overload.  -her resp status did improve with IV Lasix  -no need to diurese further  -can restart her home diuretic  -restart losartan if hypertensive    -DVT prophylaxis  -monitor electrolytes, keep k>4, Mg>2     -will follow with you

## 2021-04-12 NOTE — PROGRESS NOTE ADULT - ASSESSMENT
Incomplete  54 y/o woman w PMhx ITP (dx'd 1/2020, under care Curtis Blanca and John Moore, Rx'd w steroids w effect, off [id number], last 3-4mon baseline plts 107k-109k), seizures, HTN, hypothyroid, depression, admitted for   symptomatic incarcerated ventral incisional hernia and is now POD1 s/p laparoscopic repair of incarcerated inguinal hernia, course was complicated by expiratory stridor and required reintubation, patient successfully extubated in ICU and receiving decadron 10mg q8, is vastly improved, currently stable on room air.    Neuro  - Patient is stable  - continue home Topamax and Lexapro    Cardio  - Stable, no evidence of acute ischemia  - previous EKG revealed sinus bradycardia, respiratory status improved s/p Lasix yesterday for pulmonary edema  - Restart home HCZ, add losartan is elevated blood pressures  - Start elliquis prophylactic dose for DVT prophylaxis    Pulm  - Stable on room air,  clear lungs  - successful extubation yesterday, continue decadron 10mg q8 today, wean to prednisone tomorrow  - recommend nighttime CPAP for undiagnosed DOROTA    GI  - surgery following patient, incisions clean and dry, no drainage  -                                                                                                                                                                                                                                                                                                                                                                                      Incomplete  56 y/o woman w PMhx ITP (dx'd 1/2020, under care Curtis Blanca and John Moore, Rx'd w steroids w effect, off [id number], last 3-4mon baseline plts 107k-109k), seizures, HTN, hypothyroid, depression, admitted for   symptomatic incarcerated ventral incisional hernia and is now POD1 s/p laparoscopic repair of incarcerated inguinal hernia, course was complicated by expiratory stridor and required reintubation, patient successfully extubated in ICU and receiving decadron 10mg q8, is vastly improved, currently stable on room air.    Neuro  - Patient is stable  - continue home Topamax and Lexapro    Cardio  - Stable, no evidence of acute ischemia  - previous EKG revealed sinus bradycardia, respiratory status improved s/p Lasix yesterday for pulmonary edema  - Restart home HCZ, add losartan if elevated blood pressures    Pulm  - Stable on room air,  clear lungs  - successful extubation yesterday, continue decadron 10mg q8 today  - transition to prednisone 40mg tomorrow, rapid taper  - repeat blood gas vastly improved, ph: 7.45, pCO2:28, pO2:57, HCO3: 22    GI  - surgery following patient, incisions clean and dry, no drainage, okay for discharge    Heme  - On decadron 10 q8, to transition to prednisone taper starting tomorrow  - f/u with outpatient doctor Dr. Pena    Endocrine  - continue Levothyroxine home dose    ID  - no concerns, elevated WBC in setting of recent surgery and steroid use.                                                                                                                                                                                                                                                                                                                                                                                         54 y/o woman w PMhx ITP (dx'd 1/2020, under care Curtis Blanca and John Moore, Rx'd w steroids w effect, off [id number], last 3-4mon baseline plts 107k-109k), seizures, HTN, hypothyroid, depression, admitted for   symptomatic incarcerated ventral incisional hernia and is now POD1 s/p laparoscopic repair of incarcerated inguinal hernia, course was complicated by expiratory stridor and required reintubation, patient successfully extubated in ICU and receiving decadron 10mg q8, is vastly improved, currently stable on room air.    Neuro  - Patient is stable  - continue home Topamax and Lexapro    Cardio  - Stable, no evidence of acute ischemia  - previous EKG revealed sinus bradycardia, respiratory status improved s/p Lasix yesterday for pulmonary edema  - Restart home HCZ, add losartan if elevated blood pressures    Pulm  - Stable on room air,  clear lungs  - successful extubation yesterday, continue decadron 10mg q8 today  - transition to prednisone 40mg tomorrow, rapid taper  - repeat blood gas vastly improved, ph: 7.45, pCO2:28, pO2:57, HCO3: 22. Respiratory alkalosis likely 2/2 atelectasis     GI  - surgery following patient, incisions clean and dry, no drainage, okay for discharge    Heme  - On decadron 10 q8, to transition to prednisone taper starting tomorrow  - f/u with outpatient doctor Dr. Pena    Endocrine  - continue Levothyroxine home dose    ID  - no concerns, elevated WBC in setting of recent surgery and steroid use.    Dispo: medically stable for discharge home

## 2021-04-12 NOTE — PROGRESS NOTE ADULT - SUBJECTIVE AND OBJECTIVE BOX
Patient seen and examined;  Chart reviewed and events noted;   had stridor yesterday and was re-intubated; now extubated and on room air    MEDICATIONS  (STANDING):  chlorhexidine 4% Liquid 1 Application(s) Topical <User Schedule>  dexAMETHasone  IVPB 10 milliGRAM(s) IV Intermittent every 8 hours  escitalopram 20 milliGRAM(s) Oral daily  levothyroxine 175 MICROGram(s) Oral daily  topiramate 100 milliGRAM(s) Oral daily      Vital Signs Last 24 Hrs  T(C): 36.9 (12 Apr 2021 07:44), Max: 37 (11 Apr 2021 19:42)  T(F): 98.5 (12 Apr 2021 07:44), Max: 98.6 (11 Apr 2021 19:42)  HR: 75 (12 Apr 2021 07:35) (55 - 89)  BP: 143/71 (12 Apr 2021 07:00) (90/50 - 177/83)  BP(mean): 101 (12 Apr 2021 07:00) (65 - 127)  RR: 17 (12 Apr 2021 07:00) (10 - 38)  SpO2: 91% (12 Apr 2021 07:00) (89% - 98%)    PHYSICAL EXAM  General: adult in NAD  HEENT: clear oropharynx, anicteric sclera, pink conjunctivae  Neck: supple  CV: normal S1S2 with no murmur rubs or gallops  Lungs: clear to auscultation, no wheezes, no rhales  Abdomen:  trochar sites healing well  Ext: no clubbing cyanosis or edema  Skin: no rashes and no petichiae  Neuro: alert and oriented X3 no focal deficits      LABS:                        12.3   16.32 )-----------( 112      ( 12 Apr 2021 05:48 )             35.7     Hemoglobin: 12.3 g/dL (04-12 @ 05:48)  Hemoglobin: 12.8 g/dL (04-11 @ 02:54)    WBC Count: 16.32 K/uL (04-12 @ 05:48)  WBC Count: 12.01 K/uL (04-11 @ 02:54)    Platelet Count - Automated: 112 K/uL (04-12 @ 05:48)  Platelet Count - Automated: 109 K/uL (04-11 @ 02:54)    04-12    142  |  111<H>  |  16  ----------------------------<  96  4.1   |  22  |  0.90    Ca    8.8      12 Apr 2021 05:48    TPro  6.9  /  Alb  3.1<L>  /  TBili  0.6  /  DBili  x   /  AST  24  /  ALT  27  /  AlkPhos  117  04-11    PT/INR - ( 11 Apr 2021 10:17 )   PT: 12.7 sec;   INR: 1.09 ratio    PTT - ( 11 Apr 2021 10:17 )  PTT:51.2 sec

## 2021-04-12 NOTE — PROGRESS NOTE ADULT - SUBJECTIVE AND OBJECTIVE BOX
pt seen  extubated yesterday  minimal pain  -n-v  ICU Vital Signs Last 24 Hrs  T(C): 36.9 (12 Apr 2021 07:44), Max: 37 (11 Apr 2021 19:42)  T(F): 98.5 (12 Apr 2021 07:44), Max: 98.6 (11 Apr 2021 19:42)  HR: 58 (12 Apr 2021 09:00) (55 - 89)  BP: 129/72 (12 Apr 2021 09:00) (90/50 - 177/83)  BP(mean): 96 (12 Apr 2021 09:00) (65 - 127)  ABP: --  ABP(mean): --  RR: 21 (12 Apr 2021 09:00) (10 - 38)  SpO2: 92% (12 Apr 2021 09:00) (89% - 98%)  gen-NAD  resp-clear  abd-soft Nt/ND                        12.3   16.32 )-----------( 112      ( 12 Apr 2021 05:48 )             35.7   04-12    142  |  111<H>  |  16  ----------------------------<  96  4.1   |  22  |  0.90    Ca    8.8      12 Apr 2021 05:48    TPro  6.9  /  Alb  3.1<L>  /  TBili  0.6  /  DBili  x   /  AST  24  /  ALT  27  /  AlkPhos  117  04-11

## 2021-04-12 NOTE — PROGRESS NOTE ADULT - SUBJECTIVE AND OBJECTIVE BOX
Patient is a 55y old  Female who presents with a chief complaint of Acute Hypoxic Respiratory Failure (2021 09:06)      INTERVAL HPI/OVERNIGHT EVENTS:  Patient seen and examined at bedside. Patient was extubated yesterday and feeling well other than some soreness of the throat. She is hungry and would like to eat. Admits to some tenderness near the surgical incisions but otherwise offers no complaints. Denies dyspnea, wheezing, cough, swelling of the throat, difficulty swallowing.     MEDICATIONS  (STANDING):  chlorhexidine 4% Liquid 1 Application(s) Topical <User Schedule>  escitalopram 20 milliGRAM(s) Oral daily  levothyroxine 175 MICROGram(s) Oral daily  topiramate 100 milliGRAM(s) Oral daily    MEDICATIONS  (PRN):      Allergies    No Known Allergies    Intolerances        REVIEW OF SYSTEMS:  CONSTITUTIONAL: No fever or chills  HEENT:  No headache, no sore throat  RESPIRATORY: No cough, wheezing, or shortness of breath  CARDIOVASCULAR: No chest pain, palpitations  GASTROINTESTINAL: No abd pain, nausea, vomiting, or diarrhea  GENITOURINARY: No dysuria, frequency, or hematuria  NEUROLOGICAL: no focal weakness or dizziness  MUSCULOSKELETAL: no myalgias     Vital Signs Last 24 Hrs  T(C): 36.9 (2021 07:44), Max: 37 (2021 19:42)  T(F): 98.5 (2021 07:44), Max: 98.6 (2021 19:42)  HR: 58 (2021 09:00) (55 - 89)  BP: 129/72 (2021 09:00) (90/50 - 177/83)  BP(mean): 96 (2021 09:00) (65 - 127)  RR: 21 (2021 09:00) (10 - 38)  SpO2: 92% (2021 09:00) (89% - 98%)    PHYSICAL EXAM:  GENERAL: NAD, sitting up in chair comfortably on room air   HEENT:  anicteric, moist mucous membranes  CHEST/LUNG:  CTA b/l, no rales, wheezes, or rhonchi  HEART:  RRR, S1, S2, no murmurs, no rubs, or gallops   ABDOMEN: normoactive bowel sounds, soft, tender to palpation of epigastric and periumbilical area, nondistended  EXTREMITIES: no edema, cyanosis, or calf tenderness  NERVOUS SYSTEM: answers questions and follows commands appropriately    LABS:                        12.3   16.32 )-----------( 112      ( 2021 05:48 )             35.7     CBC Full  -  ( 2021 05:48 )  WBC Count : 16.32 K/uL  Hemoglobin : 12.3 g/dL  Hematocrit : 35.7 %  Platelet Count - Automated : 112 K/uL  Mean Cell Volume : 85.4 fl  Mean Cell Hemoglobin : 29.4 pg  Mean Cell Hemoglobin Concentration : 34.5 gm/dL  Auto Neutrophil # : x  Auto Lymphocyte # : x  Auto Monocyte # : x  Auto Eosinophil # : x  Auto Basophil # : x  Auto Neutrophil % : x  Auto Lymphocyte % : x  Auto Monocyte % : x  Auto Eosinophil % : x  Auto Basophil % : x    2021 05:48    142    |  111    |  16     ----------------------------<  96     4.1     |  22     |  0.90     Ca    8.8        2021 05:48      PT/INR - ( 2021 10:17 )   PT: 12.7 sec;   INR: 1.09 ratio         PTT - ( 2021 10:17 )  PTT:51.2 sec  Urinalysis Basic - ( 2021 07:49 )    Color: Pale Yellow / Appearance: Clear / S.005 / pH: x  Gluc: x / Ketone: Negative  / Bili: Negative / Urobili: Negative   Blood: x / Protein: Negative / Nitrite: Negative   Leuk Esterase: Negative / RBC: x / WBC x   Sq Epi: x / Non Sq Epi: x / Bacteria: x      CAPILLARY BLOOD GLUCOSE              RADIOLOGY & ADDITIONAL TESTS:    Personally reviewed.     Consultant(s) Notes Reviewed:  [x] YES  [ ] NO

## 2021-04-12 NOTE — DISCHARGE NOTE NURSING/CASE MANAGEMENT/SOCIAL WORK - NSDCFUADDAPPT_GEN_ALL_CORE_FT
Follow up with Dr. Mina within 1 week of discharge.  Call the office to schedule an appointment.  You should also follow up with your primary care doctor at your earliest convenience for further evaluation of bilateral adrenal nodules on CT.

## 2021-04-12 NOTE — PROGRESS NOTE ADULT - SUBJECTIVE AND OBJECTIVE BOX
[facility] - Felipe Hansen, Rachael, Pieter, Davey, Renee Calloway  Office Number:  [phone]    Patient resting comfortably in bed in NAD.  Laying flat with no respiratory distress.  No complaints of chest pain, dyspnea, palpitations, PND, or orthopnea.  s/p extubation yesterday    ROS: negative unless otherwise mentioned.    Telemetry:  sr    MEDICATIONS  (STANDING):  chlorhexidine 4% Liquid 1 Application(s) Topical <User Schedule>  dexAMETHasone  IVPB 10 milliGRAM(s) IV Intermittent every 8 hours  escitalopram 20 milliGRAM(s) Oral daily  levothyroxine 175 MICROGram(s) Oral daily  topiramate 100 milliGRAM(s) Oral daily    MEDICATIONS  (PRN):      Allergies    No Known Allergies    Intolerances        Vital Signs Last 24 Hrs  T(C): 36.9 (12 Apr 2021 07:44), Max: 37 (11 Apr 2021 19:42)  T(F): 98.5 (12 Apr 2021 07:44), Max: 98.6 (11 Apr 2021 19:42)  HR: 75 (12 Apr 2021 07:35) (55 - 89)  BP: 143/71 (12 Apr 2021 07:00) (90/50 - 177/83)  BP(mean): 101 (12 Apr 2021 07:00) (65 - 127)  RR: 17 (12 Apr 2021 07:00) (10 - 38)  SpO2: 91% (12 Apr 2021 07:00) (89% - 98%)    I&O's Summary    11 Apr 2021 07:01  -  12 Apr 2021 07:00  --------------------------------------------------------  IN: 312.3 mL / OUT: 1600 mL / NET: -1287.7 mL        ON EXAM:    General: NAD, awake and alert, oriented x 3  HEENT: Mucous membranes are moist, anicteric  Lungs: Non-labored, breath sounds are clear bilaterally, No wheezing, rales or rhonchi  Cardiovascular: Regular, S1 and S2, no murmurs, rubs, or gallops  Gastrointestinal: Bowel Sounds present, soft, nontender.   Lymph: No peripheral edema. No lymphadenopathy.  Skin: No rashes or ulcers  Psych:  Mood & affect appropriate    LABS: All Labs Reviewed:                        12.3   16.32 )-----------( 112      ( 12 Apr 2021 05:48 )             35.7                         12.8   12.01 )-----------( 109      ( 11 Apr 2021 02:54 )             37.6     12 Apr 2021 05:48    142    |  111    |  16     ----------------------------<  96     4.1     |  22     |  0.90   11 Apr 2021 02:54    140    |  112    |  18     ----------------------------<  100    3.6     |  23     |  1.00     Ca    8.8        12 Apr 2021 05:48  Ca    8.7        11 Apr 2021 02:54    TPro  6.9    /  Alb  3.1    /  TBili  0.6    /  DBili  x      /  AST  24     /  ALT  27     /  AlkPhos  117    11 Apr 2021 02:54    PT/INR - ( 11 Apr 2021 10:17 )   PT: 12.7 sec;   INR: 1.09 ratio         PTT - ( 11 Apr 2021 10:17 )  PTT:51.2 sec      Blood Culture:

## 2021-04-12 NOTE — CHART NOTE - NSCHARTNOTEFT_GEN_A_CORE
:  Michael Lozano MD    INDICATION:    Respiratory Failure (J96.00)    PROCEDURE:  [x] LIMITED ECHO  [x] LIMITED CHEST  [ ] LIMITED RETROPERITONEAL  [ ] LIMITED ABDOMINAL  [ ] LIMITED DVT  [ ] NEEDLE GUIDANCE VASCULAR  [ ] NEEDLE GUIDANCE THORACENTESIS  [ ] NEEDLE GUIDANCE PARACENTESIS  [ ] NEEDLE GUIDANCE PERICARDIOCENTESIS  [ ] OTHER    FINDINGS:  Scattered B lines anteriorly with A-line predominance  No pleural effusion  Normal LV systolic function  LV>RV  No significant pericardial effusion  IVC<2cm with variation    INTERPRETATION:    Resolving pulmonary edema  No pleural effusion  Normal LV systolic function  Normal RV size and systolic function  No pericardial effusion  Normal IVC suggestive of euvolemic volume status

## 2021-04-12 NOTE — DIETITIAN INITIAL EVALUATION ADULT. - OTHER INFO
Pt admit with incarcerated hernia, s/p laparoscopic repair, developed resp failure/pulm edema after OR and was reintubated. Currently extubated sitting in chair. Hx HTN,ITP, obese class 1. NPO at present with hypoactive BS. BMI 32.

## 2021-04-12 NOTE — PROGRESS NOTE ADULT - SUBJECTIVE AND OBJECTIVE BOX
Patient is a 55y old  Female who presents with a chief complaint of hernia (2021 08:06)      Interval Events:  Patient was seen and examined at bedside. Patient had expiratory stridor yesterday and was extubated successfully in the ICU.  Patient is stable on room air and reports she is feeling much better and reports no trouble breathing. Patient denies any SOB, dyspnea, or orthopnea. Patient reports that she has no complaints apart from some abdominal pain related to her recent surgery. Denies any fever, chills, chest pain, palpitations, nausea, vomiting, diarrhea, or constipation.    Review of Systems:  Constitutional: no fever, chills, fatigue  Resp: no cough, wheezing, shortness of breath  CV: no chest pain, palpitations, leg swelling  GI: no abdominal pain, nausea, vomiting, diarrhea   : no dysuria, frequency, incontinence    T(F): 98.5 (21 @ 07:44), Max: 98.6 (21 @ 19:42)  HR: 75 (21 @ 07:35) (55 - 89)  BP: 143/71 (21 @ 07:00) (90/50 - 177/83)  RR: 17 (21 @ 07:00) (10 - 38)  SpO2: 91% (21 @ 07:00) (89% - 98%)  Wt(kg): --    Mode: standby      CAPILLARY BLOOD GLUCOSE        I&O's Summary    2021 07:01  -  2021 07:00  --------------------------------------------------------  IN: 312.3 mL / OUT: 1600 mL / NET: -1287.7 mL        Physical Exam:   Gen: Middle aged female resting comfortably in chair, NAD  CV: +S1, S2. Regular rate and rhythm No m/r/g  Pulm: CTA B/L. No r/r/w.  GI: Soft, slightly tender to palpation over incision sites. +bowel sounds  Ext: Warm Well Perfused, No LE swelling      Meds:        dexAMETHasone  IVPB 10 milliGRAM(s) IV Intermittent every 8 hours  levothyroxine 175 MICROGram(s) Oral daily      escitalopram 20 milliGRAM(s) Oral daily  topiramate 100 milliGRAM(s) Oral daily              chlorhexidine 4% Liquid 1 Application(s) Topical <User Schedule>          Labs:                        12.3   16.32 )-----------( 112      ( 2021 05:48 )             35.7       04-12    142  |  111<H>  |  16  ----------------------------<  96  4.1   |  22  |  0.90    Ca    8.8      2021 05:48    TPro  6.9  /  Alb  3.1<L>  /  TBili  0.6  /  DBili  x   /  AST  24  /  ALT  27  /  AlkPhos  117  04-11          PT/INR - ( 2021 10:17 )   PT: 12.7 sec;   INR: 1.09 ratio         PTT - ( 2021 10:17 )  PTT:51.2 sec  Urinalysis Basic - ( 2021 07:49 )    Color: Pale Yellow / Appearance: Clear / S.005 / pH: x  Gluc: x / Ketone: Negative  / Bili: Negative / Urobili: Negative   Blood: x / Protein: Negative / Nitrite: Negative   Leuk Esterase: Negative / RBC: x / WBC x   Sq Epi: x / Non Sq Epi: x / Bacteria: x            ABG - ( 2021 13:17 )  pH, Arterial: 7.13  pH, Blood: x     /  pCO2: 63    /  pO2: 73    / HCO3: ?16.6 / Base Excess: -7.7  /  SaO2: ?89.8             Radiology:     < from: Xray Chest 1 View AP/PA (21 @ 13:11) >  INTERPRETATION:    New ET tube with tip2.7 cm above the fanta.  Right lung is clear.  There is new pulmonary vascular redistribution/congestion on the left.  There is new left basilar is/retrocardiac opacity with obscuration of part of the left hemidiaphragm.  There is new platelike atelectasis in the left mid to lower lung.  No right pleural effusion. No pneumothorax.            IMPRESSION:  ET tube 2.7 cm above the fanta on most recent image.    New pulmonary vascular redistribution/congestion on the left on most recent image.    New left mid to lower lung platelike atelectasis.    New left basilar and retrocardiac opacity which may be due to a left pleural effusion with passive atelectasis, atelectasis of other cause, and/or pneumonia in the appropriate clinical context.    < from: CT Abdomen and Pelvis w/ Oral Cont and w/ IV Cont (21 @ 06:34) >  IMPRESSION:  Small fat-containing midline umbilical ventral abdominal wall hernia with slight infiltration of the fat in the hernia sac which is new compared with prior exam and may represent incarcerated mesenteric fat.    No bowel obstruction or evidence of bowel inflammation.    Bilateral adrenal nodules, statistically adenomas; nonemergent MRI of the abdomen may be considered for better characterization.      CENTRAL LINE: N   DATE INSERTED:   REMOVE: Y/N    GUAJARDO: N      DATE INSERTED:        REMOVE: Y/N    A-LINE: N     DATE INSERTED:              REMOVE: Y/N    GLOBAL ISSUE/BEST PRACTICE:  Analgesia: Y  Sedation: N  CAM-ICU: N  HOB elevation: Y  Stress ulcer prophylaxis: N  VTE prophylaxis: Y  Glycemic control: N  Nutrition: Y    CODE STATUS: FULL CODE Patient is a 55y old  Female who presents with a chief complaint of hernia (2021 08:06)      Interval Events:  Patient was seen and examined at bedside. Patient had expiratory stridor yesterday and was extubated successfully in the ICU.  Patient is stable on room air and reports she is feeling much better and reports no trouble breathing. Patient denies any SOB, dyspnea, or orthopnea. Patient reports that she has no complaints apart from some abdominal pain related to her recent surgery. Denies any fever, chills, chest pain, palpitations, nausea, vomiting, diarrhea, or constipation. Pt tolerated diet well and no acute complaints.     Review of Systems:  Constitutional: no fever, chills, fatigue  Resp: no cough, wheezing, shortness of breath, +hoarseness ( 2/2 intubation/extubation)  CV: no chest pain, palpitations, leg swelling  GI: + abdominal pain (POD 1), no nausea, vomiting, diarrhea   : no dysuria, frequency, incontinence    T(F): 98.5 (21 @ 07:44), Max: 98.6 (21 @ 19:42)  HR: 75 (21 @ 07:35) (55 - 89)  BP: 143/71 (21 @ 07:00) (90/50 - 177/83)  RR: 17 (21 @ 07:00) (10 - 38)  SpO2: 91% (21 @ 07:00) (89% - 98%)  Wt(kg): --    Mode: standby      CAPILLARY BLOOD GLUCOSE        I&O's Summary    2021 07:01  -  2021 07:00  --------------------------------------------------------  IN: 312.3 mL / OUT: 1600 mL / NET: -1287.7 mL        Physical Exam:   Gen: Middle aged female resting comfortably in chair, NAD  CV: +S1, S2. Regular rate and rhythm No m/r/g  Pulm: CTA B/L. No r/r/w.  GI: Soft, slightly tender to palpation over incision site. +bowel sounds  Ext: Warm Well Perfused, No LE swelling      Meds:        dexAMETHasone  IVPB 10 milliGRAM(s) IV Intermittent every 8 hours  levothyroxine 175 MICROGram(s) Oral daily      escitalopram 20 milliGRAM(s) Oral daily  topiramate 100 milliGRAM(s) Oral daily              chlorhexidine 4% Liquid 1 Application(s) Topical <User Schedule>          Labs:                        12.3   16.32 )-----------( 112      ( 2021 05:48 )             35.7       04-12    142  |  111<H>  |  16  ----------------------------<  96  4.1   |  22  |  0.90    Ca    8.8      2021 05:48    TPro  6.9  /  Alb  3.1<L>  /  TBili  0.6  /  DBili  x   /  AST  24  /  ALT  27  /  AlkPhos  117  04-11          PT/INR - ( 2021 10:17 )   PT: 12.7 sec;   INR: 1.09 ratio         PTT - ( 2021 10:17 )  PTT:51.2 sec  Urinalysis Basic - ( 2021 07:49 )    Color: Pale Yellow / Appearance: Clear / S.005 / pH: x  Gluc: x / Ketone: Negative  / Bili: Negative / Urobili: Negative   Blood: x / Protein: Negative / Nitrite: Negative   Leuk Esterase: Negative / RBC: x / WBC x   Sq Epi: x / Non Sq Epi: x / Bacteria: x            ABG - ( 2021 13:17 )  pH, Arterial: 7.13  pH, Blood: x     /  pCO2: 63    /  pO2: 73    / HCO3: ?16.6 / Base Excess: -7.7  /  SaO2: ?89.8             Radiology:     < from: Xray Chest 1 View AP/PA (21 @ 13:11) >  INTERPRETATION:    New ET tube with tip2.7 cm above the fanta.  Right lung is clear.  There is new pulmonary vascular redistribution/congestion on the left.  There is new left basilar is/retrocardiac opacity with obscuration of part of the left hemidiaphragm.  There is new platelike atelectasis in the left mid to lower lung.  No right pleural effusion. No pneumothorax.            IMPRESSION:  ET tube 2.7 cm above the fanta on most recent image.    New pulmonary vascular redistribution/congestion on the left on most recent image.    New left mid to lower lung platelike atelectasis.    New left basilar and retrocardiac opacity which may be due to a left pleural effusion with passive atelectasis, atelectasis of other cause, and/or pneumonia in the appropriate clinical context.    < from: CT Abdomen and Pelvis w/ Oral Cont and w/ IV Cont (21 @ 06:34) >  IMPRESSION:  Small fat-containing midline umbilical ventral abdominal wall hernia with slight infiltration of the fat in the hernia sac which is new compared with prior exam and may represent incarcerated mesenteric fat.    No bowel obstruction or evidence of bowel inflammation.    Bilateral adrenal nodules, statistically adenomas; nonemergent MRI of the abdomen may be considered for better characterization.      CENTRAL LINE: N   DATE INSERTED:   REMOVE: Y/N    GUAJARDO: N      DATE INSERTED:        REMOVE: Y/N    A-LINE: N     DATE INSERTED:              REMOVE: Y/N    GLOBAL ISSUE/BEST PRACTICE:  Analgesia: Y  Sedation: N  CAM-ICU: N  HOB elevation: Y  Stress ulcer prophylaxis: N  VTE prophylaxis: Y  Glycemic control: N  Nutrition: Y    CODE STATUS: FULL CODE

## 2021-09-01 NOTE — ED ADULT NURSE NOTE - ED STAT RN HANDOFF TIME
07:07 Composite Graft Text: The defect edges were debeveled with a #15 scalpel blade.  Given the location of the defect, shape of the defect, the proximity to free margins and the fact the defect was full thickness a composite graft was deemed most appropriate.  The defect was outline and then transferred to the donor site.  A full thickness graft was then excised from the donor site. The graft was then placed in the primary defect, oriented appropriately and then sutured into place.  The secondary defect was then repaired using a primary closure.

## 2025-06-02 PROBLEM — D69.3 IMMUNE THROMBOCYTOPENIC PURPURA: Chronic | Status: ACTIVE | Noted: 2021-04-11

## 2025-06-02 PROBLEM — E03.9 HYPOTHYROIDISM, UNSPECIFIED: Chronic | Status: ACTIVE | Noted: 2021-04-11

## 2025-06-02 PROBLEM — K46.9 UNSPECIFIED ABDOMINAL HERNIA WITHOUT OBSTRUCTION OR GANGRENE: Chronic | Status: ACTIVE | Noted: 2021-04-11

## 2025-06-02 PROBLEM — I10 ESSENTIAL (PRIMARY) HYPERTENSION: Chronic | Status: ACTIVE | Noted: 2021-04-11

## 2025-06-02 PROBLEM — R56.9 UNSPECIFIED CONVULSIONS: Chronic | Status: ACTIVE | Noted: 2021-04-11

## 2025-06-14 NOTE — PROGRESS NOTE ADULT - ASSESSMENT
History of COPD, not on home oxygen. Currently without evidence of acute exacerbation, and sats on room air at goal 88% or greater. Will continue hospital formulary equivalent of home regimen and monitor respiratory status. Encourage smoking cessation.    56 y/o woman w hx ITP(dx'd 1/2020, under care Curtis Blanca and John Moore, Rx'd w steroids w effect, off x2rbcwmg, last 3-4mon baseline plts 107k-109k), seizures, HTN, hypothyroid, depression.  Adm w symptomatic incarcerated ventral incisional hernia and underwent surgical repair; course complicated by stridor and required re-intubation and also received high dose steroids (Decadron 10mg q8hrs);  now extubated and on room air, sitting in chair    - platelet count noted and stabl  -can maintain usual DVT/thromboembolism prophylactic therapy for this type of surgery  - would taper down to prednisone at 40mg daily with plan for rapid taper  - upon discharge she can follow with her outpatient hematologist (Dr. John Pena)  - discussed with ICU attending (Dr. Michael Lozano)

## 2025-06-19 ENCOUNTER — APPOINTMENT (OUTPATIENT)
Dept: NEUROLOGY | Facility: CLINIC | Age: 60
End: 2025-06-19
Payer: COMMERCIAL

## 2025-06-19 VITALS
HEART RATE: 76 BPM | DIASTOLIC BLOOD PRESSURE: 74 MMHG | WEIGHT: 193 LBS | BODY MASS INDEX: 30.29 KG/M2 | SYSTOLIC BLOOD PRESSURE: 112 MMHG | HEIGHT: 67 IN

## 2025-06-19 PROCEDURE — G2211 COMPLEX E/M VISIT ADD ON: CPT | Mod: NC

## 2025-06-19 PROCEDURE — 99204 OFFICE O/P NEW MOD 45 MIN: CPT

## 2025-06-19 RX ORDER — OMEPRAZOLE 40 MG/1
40 CAPSULE, DELAYED RELEASE ORAL
Refills: 0 | Status: ACTIVE | COMMUNITY

## 2025-06-19 RX ORDER — ESCITALOPRAM OXALATE 20 MG/1
20 TABLET ORAL
Refills: 0 | Status: ACTIVE | COMMUNITY

## 2025-06-19 RX ORDER — NICOTINE POLACRILEX 4 MG/1
1 GUM, CHEWING ORAL
Qty: 0 | Refills: 0 | DISCHARGE
Start: 2025-06-19

## 2025-06-19 RX ORDER — FAMOTIDINE 40 MG/1
40 TABLET, FILM COATED ORAL
Refills: 0 | Status: ACTIVE | COMMUNITY

## 2025-06-19 RX ORDER — DIVALPROEX SODIUM 500 MG/1
500 TABLET, DELAYED RELEASE ORAL
Qty: 60 | Refills: 11 | Status: ACTIVE | COMMUNITY
Start: 2025-06-19

## 2025-06-19 RX ORDER — NICOTINE 21 MG/24HR
21 PATCH, TRANSDERMAL 24 HOURS TRANSDERMAL DAILY
Qty: 30 | Refills: 0 | Status: ACTIVE | COMMUNITY
Start: 2025-06-19 | End: 1900-01-01

## 2025-06-19 RX ORDER — LEVOTHYROXINE SODIUM 0.17 MG/1
TABLET ORAL
Refills: 0 | Status: ACTIVE | COMMUNITY

## 2025-06-19 RX ORDER — LOSARTAN POTASSIUM AND HYDROCHLOROTHIAZIDE 100; 25 MG/1; MG/1
100-25 TABLET, FILM COATED ORAL
Refills: 0 | Status: ACTIVE | COMMUNITY

## 2025-06-30 ENCOUNTER — INPATIENT (INPATIENT)
Facility: HOSPITAL | Age: 60
LOS: 3 days | Discharge: HOME | DRG: 101 | End: 2025-07-04
Attending: PSYCHIATRY & NEUROLOGY | Admitting: PSYCHIATRY & NEUROLOGY
Payer: COMMERCIAL

## 2025-06-30 VITALS
SYSTOLIC BLOOD PRESSURE: 107 MMHG | RESPIRATION RATE: 16 BRPM | HEART RATE: 65 BPM | TEMPERATURE: 98 F | OXYGEN SATURATION: 94 % | DIASTOLIC BLOOD PRESSURE: 69 MMHG

## 2025-06-30 DIAGNOSIS — G40.909 EPILEPSY, UNSPECIFIED, NOT INTRACTABLE, WITHOUT STATUS EPILEPTICUS: ICD-10-CM

## 2025-06-30 DIAGNOSIS — Z90.49 ACQUIRED ABSENCE OF OTHER SPECIFIED PARTS OF DIGESTIVE TRACT: Chronic | ICD-10-CM

## 2025-06-30 DIAGNOSIS — Z98.890 OTHER SPECIFIED POSTPROCEDURAL STATES: Chronic | ICD-10-CM

## 2025-06-30 LAB
ALBUMIN SERPL ELPH-MCNC: 3.5 G/DL — SIGNIFICANT CHANGE UP (ref 3.3–5)
ALP SERPL-CCNC: 71 U/L — SIGNIFICANT CHANGE UP (ref 40–120)
ALT FLD-CCNC: 22 U/L — SIGNIFICANT CHANGE UP (ref 10–45)
ANION GAP SERPL CALC-SCNC: 13 MMOL/L — SIGNIFICANT CHANGE UP (ref 5–17)
AST SERPL-CCNC: 19 U/L — SIGNIFICANT CHANGE UP (ref 10–40)
BILIRUB SERPL-MCNC: 0.4 MG/DL — SIGNIFICANT CHANGE UP (ref 0.2–1.2)
BUN SERPL-MCNC: 16 MG/DL — SIGNIFICANT CHANGE UP (ref 7–23)
CALCIUM SERPL-MCNC: 9.1 MG/DL — SIGNIFICANT CHANGE UP (ref 8.4–10.5)
CHLORIDE SERPL-SCNC: 103 MMOL/L — SIGNIFICANT CHANGE UP (ref 96–108)
CO2 SERPL-SCNC: 21 MMOL/L — LOW (ref 22–31)
CREAT SERPL-MCNC: 0.95 MG/DL — SIGNIFICANT CHANGE UP (ref 0.5–1.3)
EGFR: 70 ML/MIN/1.73M2 — SIGNIFICANT CHANGE UP
EGFR: 70 ML/MIN/1.73M2 — SIGNIFICANT CHANGE UP
GLUCOSE SERPL-MCNC: 108 MG/DL — HIGH (ref 70–99)
HCT VFR BLD CALC: 38.7 % — SIGNIFICANT CHANGE UP (ref 34.5–45)
HGB BLD-MCNC: 13.1 G/DL — SIGNIFICANT CHANGE UP (ref 11.5–15.5)
MAGNESIUM SERPL-MCNC: 2.1 MG/DL — SIGNIFICANT CHANGE UP (ref 1.6–2.6)
MCHC RBC-ENTMCNC: 30.4 PG — SIGNIFICANT CHANGE UP (ref 27–34)
MCHC RBC-ENTMCNC: 33.9 G/DL — SIGNIFICANT CHANGE UP (ref 32–36)
MCV RBC AUTO: 89.8 FL — SIGNIFICANT CHANGE UP (ref 80–100)
NRBC # BLD AUTO: 0 K/UL — SIGNIFICANT CHANGE UP (ref 0–0)
NRBC # FLD: 0 K/UL — SIGNIFICANT CHANGE UP (ref 0–0)
NRBC BLD AUTO-RTO: 0 /100 WBCS — SIGNIFICANT CHANGE UP (ref 0–0)
PHOSPHATE SERPL-MCNC: 3.7 MG/DL — SIGNIFICANT CHANGE UP (ref 2.5–4.5)
PLATELET # BLD AUTO: 151 K/UL — SIGNIFICANT CHANGE UP (ref 150–400)
PMV BLD: 10.7 FL — SIGNIFICANT CHANGE UP (ref 7–13)
POTASSIUM SERPL-MCNC: 3.8 MMOL/L — SIGNIFICANT CHANGE UP (ref 3.5–5.3)
POTASSIUM SERPL-SCNC: 3.8 MMOL/L — SIGNIFICANT CHANGE UP (ref 3.5–5.3)
PROT SERPL-MCNC: 6.2 G/DL — SIGNIFICANT CHANGE UP (ref 6–8.3)
RBC # BLD: 4.31 M/UL — SIGNIFICANT CHANGE UP (ref 3.8–5.2)
RBC # FLD: 13 % — SIGNIFICANT CHANGE UP (ref 10.3–14.5)
SODIUM SERPL-SCNC: 137 MMOL/L — SIGNIFICANT CHANGE UP (ref 135–145)
VALPROATE SERPL-MCNC: 62 UG/ML — SIGNIFICANT CHANGE UP (ref 50–100)
WBC # BLD: 11.94 K/UL — HIGH (ref 3.8–10.5)
WBC # FLD AUTO: 11.94 K/UL — HIGH (ref 3.8–10.5)

## 2025-06-30 PROCEDURE — 80164 ASSAY DIPROPYLACETIC ACD TOT: CPT

## 2025-06-30 PROCEDURE — 95720 EEG PHY/QHP EA INCR W/VEEG: CPT

## 2025-06-30 PROCEDURE — 84100 ASSAY OF PHOSPHORUS: CPT

## 2025-06-30 PROCEDURE — 85027 COMPLETE CBC AUTOMATED: CPT

## 2025-06-30 PROCEDURE — 80053 COMPREHEN METABOLIC PANEL: CPT

## 2025-06-30 PROCEDURE — 83735 ASSAY OF MAGNESIUM: CPT

## 2025-06-30 RX ORDER — LOSARTAN POTASSIUM 100 MG/1
50 TABLET, FILM COATED ORAL DAILY
Refills: 0 | Status: DISCONTINUED | OUTPATIENT
Start: 2025-06-30 | End: 2025-07-04

## 2025-06-30 RX ORDER — NICOTINE POLACRILEX 4 MG/1
1 GUM, CHEWING ORAL EVERY 24 HOURS
Refills: 0 | Status: DISCONTINUED | OUTPATIENT
Start: 2025-06-30 | End: 2025-07-04

## 2025-06-30 RX ORDER — MIDAZOLAM IN 0.9 % SOD.CHLORID 1 MG/ML
2 PLASTIC BAG, INJECTION (ML) INTRAVENOUS ONCE
Refills: 0 | Status: DISCONTINUED | OUTPATIENT
Start: 2025-06-30 | End: 2025-07-04

## 2025-06-30 RX ORDER — ESCITALOPRAM OXALATE 20 MG/1
20 TABLET ORAL DAILY
Refills: 0 | Status: DISCONTINUED | OUTPATIENT
Start: 2025-06-30 | End: 2025-07-04

## 2025-06-30 RX ORDER — OMEPRAZOLE 20 MG/1
1 CAPSULE, DELAYED RELEASE ORAL
Refills: 0 | DISCHARGE

## 2025-06-30 RX ORDER — LEVOTHYROXINE SODIUM 300 MCG
175 TABLET ORAL DAILY
Refills: 0 | Status: DISCONTINUED | OUTPATIENT
Start: 2025-06-30 | End: 2025-07-04

## 2025-06-30 RX ADMIN — NICOTINE POLACRILEX 1 PATCH: 4 GUM, CHEWING ORAL at 18:50

## 2025-06-30 RX ADMIN — Medication 40 MILLIGRAM(S): at 21:41

## 2025-06-30 RX ADMIN — Medication 500 MILLIGRAM(S): at 17:51

## 2025-06-30 RX ADMIN — NICOTINE POLACRILEX 1 PATCH: 4 GUM, CHEWING ORAL at 19:00

## 2025-06-30 NOTE — H&P ADULT - ATTENDING COMMENTS
history as above    will monitor off medications    for now decrease depakote to 250 bid for 2 doses and then stop    rescue midazolam 2 , vimpat 100

## 2025-06-30 NOTE — H&P ADULT - ASSESSMENT
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 Seizure semiology described as staring episodes with inability to speak, recently with R hand shaking. She also experiences auras of warmth and positive sensation, as well as out of body experience. Patient reports history of staring episodes in childhood. Patient reports seizure frequency has been increasing over past 9 months.    Impression: Focal epilepsy with increasing seizure frequency over recent months, presenting for EMU admission for event characterization and medication adjustment.    Plan:  [] VPA level, CBC, CMP, magnesium, and phosphorous   [] Video EEG  [] Continue home dose Depakote  mg BID for now  [] Rescue medications: 1st line- Midazolam 2 mg IV push, 2nd line- Depakote 1g IV push  [] Telemetry  [] Vital signs and neurological checks q4h  [] Seizure, fall and aspiration precautions.   [] Continue home medications  [] Diet: Regular  [] GI ppx: PPI and famotidine (home regimen)  [] DVT prophylaxis: SCD for now given history ITP. Can start Lovenox if platelets within normal limits    To be formally seen with attending on AM rounds.

## 2025-06-30 NOTE — H&P ADULT - NSHPPHYSICALEXAM_GEN_ALL_CORE
Physical Examination:  General - NAD, pleasant, cooperative   Cardiovascular - Peripheral pulses palpable, no edema  Neurologic Exam:  Mental status - Awake, Alert, Oriented to person, place, and time. Speech fluent, repetition and naming intact. Follows simple and complex commands.    Cranial nerves:  CN II: Visual fields are full to confrontation. Pupils are 4 mm and briskly reactive to light.   CN III, IV, VI: EOMI, no nystagmus, no ptosis  CN V: Facial sensation is intact to pinprick in all 3 divisions bilaterally.  CN VII: Face is symmetric with normal eye closure and smile.  CN VII: Hearing is normal to rubbing fingers  CN IX, X: Palate elevates symmetrically. Phonation is normal.  CN XI: Shoulder shrug intact  CN XII: Tongue is midline with normal movements and no atrophy.    Motor - Normal bulk and tone throughout. No pronator drift of out-stretched arms.  Strength testing            Deltoid(C5)  Biceps(C6)    Triceps(C7)     Wrist Extension    Wrist Flexion (C8)     Interossei  (T1)       R            5                 5                        5                     5                              5                                      5                         5  L             5                 5                        5                     5                              5                                      5                          5              Hip Flexion(L2/3)    Hip Extension (L4/5)   Knee Flexion (L4/5/S1)    Knee Extension (L3/4)   Dorsiflexion (L4/5)   Plantar Flexion (S1)  R              5                                    5                                     5                                                     5                                              5                          5  L              5                                     5                                     5                                                     5                                              5                          5    Sensation - Light touch intact throughout    DTR's -             Biceps      Triceps     Brachioradialis      Patellar    Ankle    Toes/plantar response  R             2+             2+                  2+                       2+            2+                 Down  L              2+             2+                 2+                        2+           2+                 Down    Coordination - There is no dysmetria on finger-to-nose.  Gait and station - Deferred as patient connected to EEG monitor

## 2025-06-30 NOTE — H&P ADULT - HISTORY OF PRESENT ILLNESS
Ms. 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 History obtained fro  patient and  at the bedside as well as from chart review. Patient reports that her seizure episodes began approximately 5 years ago. Seizure semiology is described as staring spells with inability to speak, lasting 1-2 minutes at a time. Patient reports that beginning several months ago, her episodes have also included shaking of her right hand. Patient is alert during these episodes and able to remember them. She also experiences auras, described as feeling of warmth and positivity, as well as an out-of-body experience. Patient reports 1 lifetime episode of GTC in 2023 after heavy alcohol use. Of note, patient also reports frequent staring episodes that occurred in childhood, however states she was never diagnosed with epilepsy as a child. Patient states she is currently driving (she has received clearance from her neurologist in the past for this). Patient reports that her seizure episodes have been increasing in frequency over the past 9 months. She recently saw LANETTE Reyes who referred her for elective EMU admission for event characterization and medication management.    Current ASM regimen: Depakote  mg BID  Previous ASMs: Topiramate 100 mg QD (stopped 2022)   Prior imaging: MRI brain w/wo contrast 1/30/2025- No acute intracranial abnormality. Moderate chronic microvascular ischemic changes.  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 She smokers 15 cigarettes daily. Denies alcohol use. Lives with her  and two daughters.

## 2025-07-01 LAB
ALBUMIN SERPL ELPH-MCNC: 3.6 G/DL — SIGNIFICANT CHANGE UP (ref 3.3–5)
ALP SERPL-CCNC: 72 U/L — SIGNIFICANT CHANGE UP (ref 40–120)
ALT FLD-CCNC: 21 U/L — SIGNIFICANT CHANGE UP (ref 10–45)
ANION GAP SERPL CALC-SCNC: 15 MMOL/L — SIGNIFICANT CHANGE UP (ref 5–17)
AST SERPL-CCNC: 18 U/L — SIGNIFICANT CHANGE UP (ref 10–40)
BILIRUB SERPL-MCNC: 0.4 MG/DL — SIGNIFICANT CHANGE UP (ref 0.2–1.2)
BUN SERPL-MCNC: 18 MG/DL — SIGNIFICANT CHANGE UP (ref 7–23)
CALCIUM SERPL-MCNC: 9.4 MG/DL — SIGNIFICANT CHANGE UP (ref 8.4–10.5)
CHLORIDE SERPL-SCNC: 104 MMOL/L — SIGNIFICANT CHANGE UP (ref 96–108)
CO2 SERPL-SCNC: 21 MMOL/L — LOW (ref 22–31)
CREAT SERPL-MCNC: 0.97 MG/DL — SIGNIFICANT CHANGE UP (ref 0.5–1.3)
EGFR: 68 ML/MIN/1.73M2 — SIGNIFICANT CHANGE UP
EGFR: 68 ML/MIN/1.73M2 — SIGNIFICANT CHANGE UP
GLUCOSE SERPL-MCNC: 90 MG/DL — SIGNIFICANT CHANGE UP (ref 70–99)
HCT VFR BLD CALC: 40.7 % — SIGNIFICANT CHANGE UP (ref 34.5–45)
HGB BLD-MCNC: 13.5 G/DL — SIGNIFICANT CHANGE UP (ref 11.5–15.5)
MCHC RBC-ENTMCNC: 29.9 PG — SIGNIFICANT CHANGE UP (ref 27–34)
MCHC RBC-ENTMCNC: 33.2 G/DL — SIGNIFICANT CHANGE UP (ref 32–36)
MCV RBC AUTO: 90.2 FL — SIGNIFICANT CHANGE UP (ref 80–100)
NRBC # BLD AUTO: 0 K/UL — SIGNIFICANT CHANGE UP (ref 0–0)
NRBC # FLD: 0 K/UL — SIGNIFICANT CHANGE UP (ref 0–0)
NRBC BLD AUTO-RTO: 0 /100 WBCS — SIGNIFICANT CHANGE UP (ref 0–0)
PLATELET # BLD AUTO: 155 K/UL — SIGNIFICANT CHANGE UP (ref 150–400)
PMV BLD: 11 FL — SIGNIFICANT CHANGE UP (ref 7–13)
POTASSIUM SERPL-MCNC: 3.8 MMOL/L — SIGNIFICANT CHANGE UP (ref 3.5–5.3)
POTASSIUM SERPL-SCNC: 3.8 MMOL/L — SIGNIFICANT CHANGE UP (ref 3.5–5.3)
PROT SERPL-MCNC: 6.4 G/DL — SIGNIFICANT CHANGE UP (ref 6–8.3)
RBC # BLD: 4.51 M/UL — SIGNIFICANT CHANGE UP (ref 3.8–5.2)
RBC # FLD: 13.2 % — SIGNIFICANT CHANGE UP (ref 10.3–14.5)
SODIUM SERPL-SCNC: 140 MMOL/L — SIGNIFICANT CHANGE UP (ref 135–145)
WBC # BLD: 11.6 K/UL — HIGH (ref 3.8–10.5)
WBC # FLD AUTO: 11.6 K/UL — HIGH (ref 3.8–10.5)

## 2025-07-01 PROCEDURE — 95720 EEG PHY/QHP EA INCR W/VEEG: CPT

## 2025-07-01 RX ORDER — CENOBAMATE 150-200 MG
1 KIT ORAL
Qty: 1 | Refills: 0
Start: 2025-07-01

## 2025-07-01 RX ORDER — ENOXAPARIN SODIUM 100 MG/ML
40 INJECTION SUBCUTANEOUS EVERY 24 HOURS
Refills: 0 | Status: DISCONTINUED | OUTPATIENT
Start: 2025-07-01 | End: 2025-07-04

## 2025-07-01 RX ORDER — LACOSAMIDE 150 MG/1
1 TABLET, FILM COATED ORAL
Qty: 60 | Refills: 0
Start: 2025-07-01

## 2025-07-01 RX ADMIN — Medication 40 MILLIGRAM(S): at 05:19

## 2025-07-01 RX ADMIN — NICOTINE POLACRILEX 1 PATCH: 4 GUM, CHEWING ORAL at 19:00

## 2025-07-01 RX ADMIN — Medication 175 MICROGRAM(S): at 05:19

## 2025-07-01 RX ADMIN — Medication 40 MILLIGRAM(S): at 21:24

## 2025-07-01 RX ADMIN — LOSARTAN POTASSIUM 50 MILLIGRAM(S): 100 TABLET, FILM COATED ORAL at 05:19

## 2025-07-01 RX ADMIN — NICOTINE POLACRILEX 1 PATCH: 4 GUM, CHEWING ORAL at 17:05

## 2025-07-01 RX ADMIN — ESCITALOPRAM OXALATE 20 MILLIGRAM(S): 20 TABLET ORAL at 17:05

## 2025-07-01 RX ADMIN — Medication 250 MILLIGRAM(S): at 09:58

## 2025-07-01 RX ADMIN — ENOXAPARIN SODIUM 40 MILLIGRAM(S): 100 INJECTION SUBCUTANEOUS at 05:19

## 2025-07-01 NOTE — EEG REPORT - NS EEG TEXT BOX
KARLI CORDOVA [facility]-[id number]     Study Date: 06-30-25 17:02 to 07-01-25 08:00  Duration: 14 hr 41 min    --------------------------------------------------------------------------------------------------  History:  CC/ HPI Patient is a 59y old  Female who presents with a chief complaint of Seizure characterization (30 Jun 2025 17:15)    MEDICATIONS  (STANDING):  enoxaparin Injectable 40 milliGRAM(s) SubCutaneous every 24 hours  escitalopram 20 milliGRAM(s) Oral daily  famotidine    Tablet 40 milliGRAM(s) Oral at bedtime  hydrochlorothiazide 12.5 milliGRAM(s) Oral daily  levothyroxine 175 MICROGram(s) Oral daily  losartan 50 milliGRAM(s) Oral daily  nicotine - 21 mG/24Hr(s) Patch 1 Patch Transdermal every 24 hours  pantoprazole    Tablet 40 milliGRAM(s) Oral before breakfast    --------------------------------------------------------------------------------------------------  Study Interpretation:    [[[Abbreviation Key:  PDR=alpha rhythm/posterior dominant rhythm. A-P=anterior posterior gradient.  Amplitude: ‘very low’:<20; ‘low’:20-50; ‘medium’:; ‘high’:>200uV.  Persistence for periodic/rhythmic patterns (% of epoch) ‘rare’:<1%; ‘occasional’:1-10%; ‘frequent’:10-50%; ‘abundant’:50-90%; ‘continuous’:>90%.  Persistence for sporadic discharges: ‘rare’:<1/hr; ‘occasional’:1/min-1/hr; ‘frequent’:>1/min; ‘abundant’:>1/10 sec.  GRDA=generalized rhythmic delta activity; FIRDA=frontal intermittent GRDA; LRDA=lateralized rhythmic delta activity; TIRDA=temporal intermittent rhythmic delta activity;  LPD=PLED=lateralized periodic discharges; GPD=generalized periodic discharges; BiPDs=BiPLEDs=bilateral independent periodic epileptiform discharges; SIRPID=stimulus induced rhythmic, periodic, or ictal appearing discharges; BIRDs=brief potentially ictal rhythmic discharges >4 Hz, lasting .5-10s; PFA=paroxysmal bursts of beta/gamma; LVFA=low voltage fast activity.  Modifiers: +F=with fast component; +S=with spike component; +R=with rhythmic component.  S-B=burst suppression pattern.  Max=maximal. N1-drowsy; N2-stage II sleep; N3-slow wave sleep. SSS/BETS=small sharp spikes/benign epileptiform transients of sleep. HV=hyperventilation; PS=photic stimulation]]]    FINDINGS:      Background:  Continuity: Continuous  Symmetry: Symmetric  PDR: 11 Hz activity, with amplitude to 40 uV, that attenuated to eye opening.    Reactivity: Present  Voltage: Normal (between 20-150uV)  Anterior Posterior Gradient: Present  Other background findings: None  Breach: Absent    Background Slowing:  Generalized slowing: None was present.  Focal slowing: None was present.    State Changes:   -Drowsiness noted with increased slowing, attenuation of fast activity, vertex transients.  -Present with N2 sleep transients with symmetric spindles and K-complexes.    Interictal Findings:  Occasional left frontotemporal (F7 maximum) spike and wave discharges were present.  Rare right frontotemporal (F8 maximum) spike and wave discharges were present.    Electrographic and Electroclinical seizures:  None    Other Clinical Events:  None    Activation Procedures:   -Hyperventilation was performed and did not elicit any abnormalities.    -Photic stimulation was performed and did not elicit any abnormalities.       Artifacts:  Intermittent myogenic and movement artifacts were noted.    ECG:  The heart rate on single channel ECG was predominantly between 60 - 70 BPM.    ASMs:  Depakote 500 mg BID    EEG Classification / Summary:  Abnormal EEG study in the awake / drowsy / asleep states  -Spikes, left > right frontotemporal    -----------------------------------------------------------------------------------------------------    Clinical Impression:  Abnormal EEG study  Risk of focal onset seizures from the left > right frontotemporal regions.  There were no seizures recorded.    -------------------------------------------------------------------------------------------------------  EEG reading room: [phone]    After-hours epilepsy service: [phone]    Haseeb Billy DO  Epilepsy Fellow

## 2025-07-02 LAB
ANION GAP SERPL CALC-SCNC: 12 MMOL/L — SIGNIFICANT CHANGE UP (ref 5–17)
BASOPHILS # BLD AUTO: 0.12 K/UL — SIGNIFICANT CHANGE UP (ref 0–0.2)
BASOPHILS # BLD MANUAL: 0.09 K/UL — SIGNIFICANT CHANGE UP (ref 0–0.2)
BASOPHILS NFR BLD AUTO: 1 % — SIGNIFICANT CHANGE UP (ref 0–2)
BASOPHILS NFR BLD MANUAL: 0.8 % — SIGNIFICANT CHANGE UP (ref 0–2)
BUN SERPL-MCNC: 18 MG/DL — SIGNIFICANT CHANGE UP (ref 7–23)
CALCIUM SERPL-MCNC: 9.7 MG/DL — SIGNIFICANT CHANGE UP (ref 8.4–10.5)
CHLORIDE SERPL-SCNC: 103 MMOL/L — SIGNIFICANT CHANGE UP (ref 96–108)
CO2 SERPL-SCNC: 24 MMOL/L — SIGNIFICANT CHANGE UP (ref 22–31)
CREAT SERPL-MCNC: 0.85 MG/DL — SIGNIFICANT CHANGE UP (ref 0.5–1.3)
EGFR: 80 ML/MIN/1.73M2 — SIGNIFICANT CHANGE UP
EGFR: 80 ML/MIN/1.73M2 — SIGNIFICANT CHANGE UP
EOSINOPHIL # BLD AUTO: 0.61 K/UL — HIGH (ref 0–0.5)
EOSINOPHIL # BLD MANUAL: 0.98 K/UL — HIGH (ref 0–0.5)
EOSINOPHIL NFR BLD AUTO: 5.3 % — SIGNIFICANT CHANGE UP (ref 0–6)
EOSINOPHIL NFR BLD MANUAL: 8.5 % — HIGH (ref 0–6)
GLUCOSE SERPL-MCNC: 85 MG/DL — SIGNIFICANT CHANGE UP (ref 70–99)
HCT VFR BLD CALC: 41.5 % — SIGNIFICANT CHANGE UP (ref 34.5–45)
HGB BLD-MCNC: 13.8 G/DL — SIGNIFICANT CHANGE UP (ref 11.5–15.5)
IMM GRANULOCYTES # BLD AUTO: 0.04 K/UL — SIGNIFICANT CHANGE UP (ref 0–0.07)
IMM GRANULOCYTES NFR BLD AUTO: 0.3 % — SIGNIFICANT CHANGE UP (ref 0–0.9)
LYMPHOCYTES # BLD AUTO: 4.6 K/UL — HIGH (ref 1–3.3)
LYMPHOCYTES # BLD MANUAL: 4.09 K/UL — HIGH (ref 1–3.3)
LYMPHOCYTES NFR BLD AUTO: 40.1 % — SIGNIFICANT CHANGE UP (ref 13–44)
LYMPHOCYTES NFR BLD MANUAL: 35.6 % — SIGNIFICANT CHANGE UP (ref 13–44)
MCHC RBC-ENTMCNC: 30.1 PG — SIGNIFICANT CHANGE UP (ref 27–34)
MCHC RBC-ENTMCNC: 33.3 G/DL — SIGNIFICANT CHANGE UP (ref 32–36)
MCV RBC AUTO: 90.4 FL — SIGNIFICANT CHANGE UP (ref 80–100)
MONOCYTES # BLD AUTO: 1.21 K/UL — HIGH (ref 0–0.9)
MONOCYTES # BLD MANUAL: 0.48 K/UL — SIGNIFICANT CHANGE UP (ref 0–0.9)
MONOCYTES NFR BLD AUTO: 10.5 % — SIGNIFICANT CHANGE UP (ref 2–14)
MONOCYTES NFR BLD MANUAL: 4.2 % — SIGNIFICANT CHANGE UP (ref 2–14)
NEUTROPHILS # BLD AUTO: 4.9 K/UL — SIGNIFICANT CHANGE UP (ref 1.8–7.4)
NEUTROPHILS # BLD MANUAL: 5.84 K/UL — SIGNIFICANT CHANGE UP (ref 1.8–7.4)
NEUTROPHILS NFR BLD AUTO: 42.8 % — LOW (ref 43–77)
NEUTROPHILS NFR BLD MANUAL: 50.9 % — SIGNIFICANT CHANGE UP (ref 43–77)
NRBC # BLD AUTO: 0 K/UL — SIGNIFICANT CHANGE UP (ref 0–0)
NRBC # FLD: 0 K/UL — SIGNIFICANT CHANGE UP (ref 0–0)
NRBC BLD AUTO-RTO: 0 /100 WBCS — SIGNIFICANT CHANGE UP (ref 0–0)
PLAT MORPH BLD: NORMAL — SIGNIFICANT CHANGE UP
PLATELET # BLD AUTO: 154 K/UL — SIGNIFICANT CHANGE UP (ref 150–400)
PMV BLD: 11.1 FL — SIGNIFICANT CHANGE UP (ref 7–13)
POTASSIUM SERPL-MCNC: 4.3 MMOL/L — SIGNIFICANT CHANGE UP (ref 3.5–5.3)
POTASSIUM SERPL-SCNC: 4.3 MMOL/L — SIGNIFICANT CHANGE UP (ref 3.5–5.3)
RBC # BLD: 4.59 M/UL — SIGNIFICANT CHANGE UP (ref 3.8–5.2)
RBC # FLD: 12.9 % — SIGNIFICANT CHANGE UP (ref 10.3–14.5)
RBC BLD AUTO: SIGNIFICANT CHANGE UP
SODIUM SERPL-SCNC: 139 MMOL/L — SIGNIFICANT CHANGE UP (ref 135–145)
WBC # BLD: 11.48 K/UL — HIGH (ref 3.8–10.5)
WBC # FLD AUTO: 11.48 K/UL — HIGH (ref 3.8–10.5)

## 2025-07-02 PROCEDURE — 95720 EEG PHY/QHP EA INCR W/VEEG: CPT

## 2025-07-02 RX ORDER — ALPRAZOLAM 0.5 MG
0.25 TABLET, EXTENDED RELEASE 24 HR ORAL ONCE
Refills: 0 | Status: DISCONTINUED | OUTPATIENT
Start: 2025-07-02 | End: 2025-07-02

## 2025-07-02 RX ADMIN — Medication 175 MICROGRAM(S): at 05:05

## 2025-07-02 RX ADMIN — Medication 40 MILLIGRAM(S): at 05:06

## 2025-07-02 RX ADMIN — Medication 0.25 MILLIGRAM(S): at 18:52

## 2025-07-02 RX ADMIN — LOSARTAN POTASSIUM 50 MILLIGRAM(S): 100 TABLET, FILM COATED ORAL at 05:05

## 2025-07-02 RX ADMIN — ESCITALOPRAM OXALATE 20 MILLIGRAM(S): 20 TABLET ORAL at 12:24

## 2025-07-02 RX ADMIN — NICOTINE POLACRILEX 1 PATCH: 4 GUM, CHEWING ORAL at 19:00

## 2025-07-02 RX ADMIN — ENOXAPARIN SODIUM 40 MILLIGRAM(S): 100 INJECTION SUBCUTANEOUS at 05:05

## 2025-07-02 RX ADMIN — Medication 40 MILLIGRAM(S): at 21:16

## 2025-07-02 RX ADMIN — NICOTINE POLACRILEX 1 PATCH: 4 GUM, CHEWING ORAL at 17:24

## 2025-07-02 NOTE — PROGRESS NOTE ADULT - ASSESSMENT
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 Seizure semiology described as staring episodes with inability to speak, recently with R hand shaking. She also experiences auras of warmth and positive sensation, as well as out of body experience. Patient reports history of staring episodes in childhood. Patient reports seizure frequency has been increasing over past 9 months.    Impression: Focal epilepsy with increasing seizure frequency over recent months, presenting for EMU admission for event characterization and medication adjustment.    Plan:  [x] Continue vEEG monitoring      CORE MEASURES:        AED levels [] Sent [] Pending [] Resulted     LFTs [] normal [] elevated      Plan and education provided to [] patient []family at bedside [] awaiting for family     Seizure Semiology  [] Tonic clonic  [] Clonic  [] Tonic  [] Unresponsive  [] Focal with impared awareness  [] Focal without impaed awareness    Obtain screening lower extremity venous ultrasound in patients who meet 1 or more of the following criteria as patient is high risk for DVT/PE on admission:   [] History of DVT/PE  [] Hypercoagulable states (Factor V Leiden, Cancer, OCP, etc. )  [] Prolonged immobility (hemiplegia/hemiparesis/post operative or any other extended immobilization)  [] Transferred from outside facility (Rehab or Long term care) 59F PMHx HTN, hypothyroidism, depression, ITP, ICA aneurysm, active smoker, focal epilepsy on depakote 500 mg BID presented for elective EMU admission. Seizure semiology described as staring episodes with inability to speak, recently with R hand shaking. She also experiences auras of warmth and positive sensation, as well as out of body experience. Patient reports history of staring episodes in childhood. Patient reports seizure frequency has been increasing over past 9 months.    Impression: Focal epilepsy with increasing seizure frequency over recent months, presenting for EMU admission for event characterization and medication adjustment.    Plan:  [x] Continue vEEG monitoring   [x] Hold home Depakote  [x] Outpatient MRI Brain reviewed, no need for further imaging inpatient  [x] Diet: Regular  [x] DVT ppx: Lovenox  [x] Dispo: Home when medically cleared     CORE MEASURES:        AED levels [] Sent [] Pending [x] Resulted     LFTs [x] normal [] elevated      Plan and education provided to [x] patient []family at bedside [] awaiting for family     Seizure Semiology  [] Tonic clonic  [] Clonic  [] Tonic  [] Unresponsive  [] Focal with impaired awareness  [x] Focal without impaired awareness

## 2025-07-02 NOTE — PROGRESS NOTE ADULT - SUBJECTIVE AND OBJECTIVE BOX
THE PATIENT WAS SEEN AND EXAMINED BY ME WITH THE HOUSESTAFF DURING MORNING ROUNDS.     HPI:  Ms. 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 History obtained fro  patient and  at the bedside as well as from chart review. Patient reports that her seizure episodes began approximately 5 years ago. Seizure semiology is described as staring spells with inability to speak, lasting 1-2 minutes at a time. Patient reports that beginning several months ago, her episodes have also included shaking of her right hand. Patient is alert during these episodes and able to remember them. She also experiences auras, described as feeling of warmth and positivity, as well as an out-of-body experience. Patient reports 1 lifetime episode of GTC in 2023 after heavy alcohol use. Of note, patient also reports frequent staring episodes that occurred in childhood, however states she was never diagnosed with epilepsy as a child. Patient states she is currently driving (she has received clearance from her neurologist in the past for this). Patient reports that her seizure episodes have been increasing in frequency over the past 9 months. She recently saw LANETTE Reyes who referred her for elective EMU admission for event characterization and medication management.    SUBJECTIVE: No events overnight. No new neurologic complaints.      enoxaparin Injectable 40 milliGRAM(s) SubCutaneous every 24 hours  escitalopram 20 milliGRAM(s) Oral daily  famotidine    Tablet 40 milliGRAM(s) Oral at bedtime  hydrochlorothiazide 12.5 milliGRAM(s) Oral daily  levothyroxine 175 MICROGram(s) Oral daily  losartan 50 milliGRAM(s) Oral daily  midazolam Injectable 2 milliGRAM(s) IV Push once PRN  nicotine - 21 mG/24Hr(s) Patch 1 Patch Transdermal every 24 hours  pantoprazole    Tablet 40 milliGRAM(s) Oral before breakfast  valproate sodium Injectable 1000 milliGRAM(s) IV Push once PRN    Physical Exam: Neurological Exam:  	Mental Status: Orientated to self, date and place. Attention intact.   	Cranial Nerves: PERRL, EOMI, no nystagmus or diplopia. No facial asymmetry.   	Motor: moving all 4 extremities equally w 5/5 strength  	Tone: normal.                  	Pronator drift: none                  	Tremor: No resting, postural or action tremor.  No myoclonus.  	Sensation: intact to light touch    LABS:                        13.8   11.48 )-----------( 154      ( 02 Jul 2025 07:13 )             41.5    07-02    139  |  103  |  18  ----------------------------<  85  4.3   |  24  |  0.85    Ca    9.7      02 Jul 2025 07:12  Phos  3.7     06-30  Mg     2.1     06-30    TPro  6.4  /  Alb  3.6  /  TBili  0.4  /  DBili  x   /  AST  18  /  ALT  21  /  AlkPhos  72  07-01    IMAGING:          THE PATIENT WAS SEEN AND EXAMINED BY ME WITH THE HOUSESTAFF DURING MORNING ROUNDS.     HPI:  Ms. Whitaekr is a 59-year-old right-handed female with PMHx HTN, hypothyroidism, ITP, depression, R ICA aneurysm, active smoker (15 cigarettes daily), focal epilepsy (on valproic acid  mg BID) who presents for elective EMU admission. History obtained fro  patient and  at the bedside as well as from chart review. Patient reports that her seizure episodes began approximately 5 years ago. Seizure semiology is described as staring spells with inability to speak, lasting 1-2 minutes at a time. Patient reports that beginning several months ago, her episodes have also included shaking of her right hand. Patient is alert during these episodes and able to remember them. She also experiences auras, described as feeling of warmth and positivity, as well as an out-of-body experience. Patient reports 1 lifetime episode of GTC in 2023 after heavy alcohol use. Of note, patient also reports frequent staring episodes that occurred in childhood, however states she was never diagnosed with epilepsy as a child. Patient states she is currently driving (she has received clearance from her neurologist in the past for this). Patient reports that her seizure episodes have been increasing in frequency over the past 9 months. She recently saw LANETTE Reyes who referred her for elective EMU admission for event characterization and medication management.    SUBJECTIVE: No events overnight. No new neurologic complaints. Event on 7/1 at 15:50 of right hand tapping, staring for 1-1.5min, EEG showed L frontotemporal seizure.    enoxaparin Injectable 40 milliGRAM(s) SubCutaneous every 24 hours  escitalopram 20 milliGRAM(s) Oral daily  famotidine    Tablet 40 milliGRAM(s) Oral at bedtime  hydrochlorothiazide 12.5 milliGRAM(s) Oral daily  levothyroxine 175 MICROGram(s) Oral daily  losartan 50 milliGRAM(s) Oral daily  midazolam Injectable 2 milliGRAM(s) IV Push once PRN  nicotine - 21 mG/24Hr(s) Patch 1 Patch Transdermal every 24 hours  pantoprazole    Tablet 40 milliGRAM(s) Oral before breakfast  valproate sodium Injectable 1000 milliGRAM(s) IV Push once PRN    Physical Exam: Neurological Exam:  	Mental Status: Oriented to self, date and place. Attention intact.   	Cranial Nerves: PERRL, EOMI, no nystagmus or diplopia. No facial asymmetry.   	Motor: moving all 4 extremities equally at least antigravity  	Tone: normal.                       	Tremor: No resting, postural or action tremor.  No myoclonus.  	Sensation: intact to light touch    LABS:                        13.8   11.48 )-----------( 154      ( 02 Jul 2025 07:13 )             41.5    07-02    139  |  103  |  18  ----------------------------<  85  4.3   |  24  |  0.85    Ca    9.7      02 Jul 2025 07:12  Phos  3.7     06-30  Mg     2.1     06-30    TPro  6.4  /  Alb  3.6  /  TBili  0.4  /  DBili  x   /  AST  18  /  ALT  21  /  AlkPhos  72  07-01    IMAGING:     EEG 7/1/25:  Abnormal EEG study  Risk of focal onset seizures from the left > right frontotemporal regions.  There were no seizures recorded.

## 2025-07-02 NOTE — EEG REPORT - NS EEG TEXT BOX
KARLI CORDOVA [facility]-[id number]     Study Date: 07-01-25 08:00 to 07-02-25 08:00  Duration: 24 hrs    --------------------------------------------------------------------------------------------------  History:  CC/ HPI Patient is a 59y old  Female who presents with a chief complaint of Seizure characterization (02 Jul 2025 08:28)    MEDICATIONS  (STANDING):  enoxaparin Injectable 40 milliGRAM(s) SubCutaneous every 24 hours  escitalopram 20 milliGRAM(s) Oral daily  famotidine    Tablet 40 milliGRAM(s) Oral at bedtime  hydrochlorothiazide 12.5 milliGRAM(s) Oral daily  levothyroxine 175 MICROGram(s) Oral daily  losartan 50 milliGRAM(s) Oral daily  nicotine - 21 mG/24Hr(s) Patch 1 Patch Transdermal every 24 hours  pantoprazole    Tablet 40 milliGRAM(s) Oral before breakfast    --------------------------------------------------------------------------------------------------  Study Interpretation:    [[[Abbreviation Key:  PDR=alpha rhythm/posterior dominant rhythm. A-P=anterior posterior gradient.  Amplitude: ‘very low’:<20; ‘low’:20-50; ‘medium’:; ‘high’:>200uV.  Persistence for periodic/rhythmic patterns (% of epoch) ‘rare’:<1%; ‘occasional’:1-10%; ‘frequent’:10-50%; ‘abundant’:50-90%; ‘continuous’:>90%.  Persistence for sporadic discharges: ‘rare’:<1/hr; ‘occasional’:1/min-1/hr; ‘frequent’:>1/min; ‘abundant’:>1/10 sec.  GRDA=generalized rhythmic delta activity; FIRDA=frontal intermittent GRDA; LRDA=lateralized rhythmic delta activity; TIRDA=temporal intermittent rhythmic delta activity;  LPD=PLED=lateralized periodic discharges; GPD=generalized periodic discharges; BiPDs=BiPLEDs=bilateral independent periodic epileptiform discharges; SIRPID=stimulus induced rhythmic, periodic, or ictal appearing discharges; BIRDs=brief potentially ictal rhythmic discharges >4 Hz, lasting .5-10s; PFA=paroxysmal bursts of beta/gamma; LVFA=low voltage fast activity.  Modifiers: +F=with fast component; +S=with spike component; +R=with rhythmic component.  S-B=burst suppression pattern.  Max=maximal. N1-drowsy; N2-stage II sleep; N3-slow wave sleep. SSS/BETS=small sharp spikes/benign epileptiform transients of sleep. HV=hyperventilation; PS=photic stimulation]]]    FINDINGS:      Background:  Continuity: Continuous  Symmetry: Symmetric  PDR: 11 Hz activity, with amplitude to 40 uV, that attenuated to eye opening.    Reactivity: Present  Voltage: Normal (between 20-150uV)  Anterior Posterior Gradient: Present  Other background findings: None  Breach: Absent    Background Slowing:  Generalized slowing: None was present.  Focal slowing: none was present.    State Changes:   -Drowsiness noted with increased slowing, attenuation of fast activity, vertex transients.  -Present with N2 sleep transients with symmetric spindles and K-complexes.    Interictal Findings:  Frequent left frontotemporal (F7 maximum) spike and wave discharges were present.  Rare right frontotemporal (F8 maximum) spike and wave discharges were present.    Electrographic and Electroclinical seizures:  One focal onset seizure from the left frontotemporal region was present at 15:44. The seizure spreads to the left hemisphere and has polymorphic delta and theta activity, at times rhythmic and sharply contoured. Clinically, the patient has right arm repetitive movements and circular movements of the right finger. The seizure lasts for 1 - 2 minutes in duration.    d1 15:44:42		Seizure onset  d1 15:44:49		L slowing  d1 15:44:50		Chewing artifact  d1 15:44:52		L hemispheric polymorphic delta slowing  d1 15:44:53		Looks up  d1 15:44:55		Opening and closing mouth  d1 15:44:59		F7F9 spike wave discharges  d1 15:45:01		Rhythmic delta 2 - 3 Hz in the left hemisphere  d1 15:45:02		Reaches for ipad  d1 15:45:05		Right hand sharking and right finger movements  d1 15:45:11		Right arm rotatory movements  d1 15:45:14		Seizure evolving in the left frontotemporal region  d1 15:45:22		Making circular motion with right index finger  d1 15:45:23		Sharply contoured delta and theta in the left hemisphere  d1 15:45:36		Opening and closing mouth  d1 15:45:43		Seizure offset  d1 15:45:52		Looking at ipad    Other Clinical Events:  Button presses at 18:09 and 07:25 without electrographic seizures. At times, patient is explaining that she had been experiencing feelings of warm sensation through her body and a rising sensation during the recording. There were no clear electrographic abnormalities captured during these events.    Activation Procedures:   -Hyperventilation was not performed.    -Photic stimulation was not performed.     Artifacts:  Intermittent myogenic and movement artifacts were noted.    ECG:  The heart rate on single channel ECG was predominantly between 60 - 70 BPM.    EEG Classification / Summary:  Abnormal EEG study in the awake / drowsy / asleep states  -One focal onset seizures, left frontotemporal, 15:44 on 07/01  -Spikes, left frontotemporal, frequent  -Spikes, right frontotemporal, rare    -----------------------------------------------------------------------------------------------------    Clinical Impression:  Abnormal EEG study  Findings indicate a focal epilepsy syndrome with one focal onset seizure from the left frontotemporal region captured at 15:44 on 07/01.  Further risk of focal onset seizures from the left > right frontotemporal regions.    This is a preliminary impression still pending attendings attestation.     -------------------------------------------------------------------------------------------------------  EEG reading room: [phone]    After-hours epilepsy service: [phone]    Haseeb Billy DO  Epilepsy Fellow KARLI CORDOVA [facility]-[id number]     Study Date: 07-01-25 08:00 to 07-02-25 08:00  Duration: 24 hrs    --------------------------------------------------------------------------------------------------  History:  CC/ HPI Patient is a 59y old  Female who presents with a chief complaint of Seizure characterization (02 Jul 2025 08:28)    MEDICATIONS  (STANDING):  enoxaparin Injectable 40 milliGRAM(s) SubCutaneous every 24 hours  escitalopram 20 milliGRAM(s) Oral daily  famotidine    Tablet 40 milliGRAM(s) Oral at bedtime  hydrochlorothiazide 12.5 milliGRAM(s) Oral daily  levothyroxine 175 MICROGram(s) Oral daily  losartan 50 milliGRAM(s) Oral daily  nicotine - 21 mG/24Hr(s) Patch 1 Patch Transdermal every 24 hours  pantoprazole    Tablet 40 milliGRAM(s) Oral before breakfast    --------------------------------------------------------------------------------------------------  Study Interpretation:    [[[Abbreviation Key:  PDR=alpha rhythm/posterior dominant rhythm. A-P=anterior posterior gradient.  Amplitude: ‘very low’:<20; ‘low’:20-50; ‘medium’:; ‘high’:>200uV.  Persistence for periodic/rhythmic patterns (% of epoch) ‘rare’:<1%; ‘occasional’:1-10%; ‘frequent’:10-50%; ‘abundant’:50-90%; ‘continuous’:>90%.  Persistence for sporadic discharges: ‘rare’:<1/hr; ‘occasional’:1/min-1/hr; ‘frequent’:>1/min; ‘abundant’:>1/10 sec.  GRDA=generalized rhythmic delta activity; FIRDA=frontal intermittent GRDA; LRDA=lateralized rhythmic delta activity; TIRDA=temporal intermittent rhythmic delta activity;  LPD=PLED=lateralized periodic discharges; GPD=generalized periodic discharges; BiPDs=BiPLEDs=bilateral independent periodic epileptiform discharges; SIRPID=stimulus induced rhythmic, periodic, or ictal appearing discharges; BIRDs=brief potentially ictal rhythmic discharges >4 Hz, lasting .5-10s; PFA=paroxysmal bursts of beta/gamma; LVFA=low voltage fast activity.  Modifiers: +F=with fast component; +S=with spike component; +R=with rhythmic component.  S-B=burst suppression pattern.  Max=maximal. N1-drowsy; N2-stage II sleep; N3-slow wave sleep. SSS/BETS=small sharp spikes/benign epileptiform transients of sleep. HV=hyperventilation; PS=photic stimulation]]]    FINDINGS:      Background:  Continuity: Continuous  Symmetry: Symmetric  PDR: 11 Hz activity, with amplitude to 40 uV, that attenuated to eye opening.    Reactivity: Present  Voltage: Normal (between 20-150uV)  Anterior Posterior Gradient: Present  Other background findings: None  Breach: Absent    Background Slowing:  Generalized slowing: None was present.  Focal slowing: none was present.    State Changes:   -Drowsiness noted with increased slowing, attenuation of fast activity, vertex transients.  -Present with N2 sleep transients with symmetric spindles and K-complexes.    Interictal Findings:  Frequent left frontotemporal (F7 maximum) spike and wave discharges were present.  Rare right frontotemporal (F8 maximum) spike and wave discharges were present.    Electrographic and Electroclinical seizures:  One focal onset seizure from the left frontotemporal region was present at 15:44. The seizure spreads to the left hemisphere and has polymorphic delta and theta activity, at times rhythmic and sharply contoured. Clinically, the patient has right arm repetitive movements and circular movements of the right finger. The seizure lasts for 1 - 2 minutes in duration.    d1 15:44:42		Seizure onset  d1 15:44:49		L slowing  d1 15:44:50		Chewing artifact  d1 15:44:52		L hemispheric polymorphic delta slowing  d1 15:44:53		Looks up  d1 15:44:55		Opening and closing mouth  d1 15:44:59		F7F9 spike wave discharges  d1 15:45:01		Rhythmic delta 2 - 3 Hz in the left hemisphere  d1 15:45:02		Reaches for ipad  d1 15:45:05		Right hand sharking and right finger movements  d1 15:45:11		Right arm rotatory movements  d1 15:45:14		Seizure evolving in the left frontotemporal region  d1 15:45:22		Making circular motion with right index finger  d1 15:45:23		Sharply contoured delta and theta in the left hemisphere  d1 15:45:36		Opening and closing mouth  d1 15:45:43		Seizure offset  d1 15:45:52		Looking at ipad    Other Clinical Events:  Button presses at 18:09 and 07:25 without electrographic seizures. At times, patient is explaining that she had been experiencing feelings of warm sensation through her body and a rising sensation during the recording. There were no clear electrographic abnormalities captured during these events.    Activation Procedures:   -Hyperventilation was not performed.    -Photic stimulation was not performed.     Artifacts:  Intermittent myogenic and movement artifacts were noted.    ECG:  The heart rate on single channel ECG was predominantly between 60 - 70 BPM.    ASMs:  Depakote 250 mg BID    EEG Classification / Summary:  Abnormal EEG study in the awake / drowsy / asleep states  -One focal onset seizures, left frontotemporal, 15:44 on 07/01  -Spikes, left frontotemporal, frequent  -Spikes, right frontotemporal, rare    -----------------------------------------------------------------------------------------------------    Clinical Impression:  Abnormal EEG study  Findings indicate a focal epilepsy syndrome with one focal onset seizure from the left frontotemporal region captured at 15:44 on 07/01.  Further risk of focal onset seizures from the left > right frontotemporal regions.    This is a preliminary impression still pending attendings attestation.     -------------------------------------------------------------------------------------------------------  EEG reading room: [phone]    After-hours epilepsy service: [phone]    Haseeb Billy DO  Epilepsy Fellow KARLI CORDOVA [facility]-[id number]     Study Date: 07-01-25 08:00 to 07-02-25 08:00  Duration: 24 hrs    --------------------------------------------------------------------------------------------------  History:  CC/ HPI Patient is a 59y old  Female who presents with a chief complaint of Seizure characterization (02 Jul 2025 08:28)    MEDICATIONS  (STANDING):  enoxaparin Injectable 40 milliGRAM(s) SubCutaneous every 24 hours  escitalopram 20 milliGRAM(s) Oral daily  famotidine    Tablet 40 milliGRAM(s) Oral at bedtime  hydrochlorothiazide 12.5 milliGRAM(s) Oral daily  levothyroxine 175 MICROGram(s) Oral daily  losartan 50 milliGRAM(s) Oral daily  nicotine - 21 mG/24Hr(s) Patch 1 Patch Transdermal every 24 hours  pantoprazole    Tablet 40 milliGRAM(s) Oral before breakfast    --------------------------------------------------------------------------------------------------  Study Interpretation:    [[[Abbreviation Key:  PDR=alpha rhythm/posterior dominant rhythm. A-P=anterior posterior gradient.  Amplitude: ‘very low’:<20; ‘low’:20-50; ‘medium’:; ‘high’:>200uV.  Persistence for periodic/rhythmic patterns (% of epoch) ‘rare’:<1%; ‘occasional’:1-10%; ‘frequent’:10-50%; ‘abundant’:50-90%; ‘continuous’:>90%.  Persistence for sporadic discharges: ‘rare’:<1/hr; ‘occasional’:1/min-1/hr; ‘frequent’:>1/min; ‘abundant’:>1/10 sec.  GRDA=generalized rhythmic delta activity; FIRDA=frontal intermittent GRDA; LRDA=lateralized rhythmic delta activity; TIRDA=temporal intermittent rhythmic delta activity;  LPD=PLED=lateralized periodic discharges; GPD=generalized periodic discharges; BiPDs=BiPLEDs=bilateral independent periodic epileptiform discharges; SIRPID=stimulus induced rhythmic, periodic, or ictal appearing discharges; BIRDs=brief potentially ictal rhythmic discharges >4 Hz, lasting .5-10s; PFA=paroxysmal bursts of beta/gamma; LVFA=low voltage fast activity.  Modifiers: +F=with fast component; +S=with spike component; +R=with rhythmic component.  S-B=burst suppression pattern.  Max=maximal. N1-drowsy; N2-stage II sleep; N3-slow wave sleep. SSS/BETS=small sharp spikes/benign epileptiform transients of sleep. HV=hyperventilation; PS=photic stimulation]]]    FINDINGS:      Background:  Continuity: Continuous  Symmetry: Symmetric  PDR: 11 Hz activity, with amplitude to 40 uV, that attenuated to eye opening.    Reactivity: Present  Voltage: Normal (between 20-150uV)  Anterior Posterior Gradient: Present  Other background findings: None  Breach: Absent    Background Slowing:  Generalized slowing: None was present.  Focal slowing: none was present.    State Changes:   -Drowsiness noted with increased slowing, attenuation of fast activity, vertex transients.  -Present with N2 sleep transients with symmetric spindles and K-complexes.    Interictal Findings:  Frequent left frontotemporal (F7 maximum) spike and wave discharges were present.  Rare right frontotemporal (F8 maximum) spike and wave discharges were present.    Electrographic and Electroclinical seizures:  One focal onset seizure from the left frontotemporal region was present at 15:44. The seizure spreads to the left hemisphere and has polymorphic delta and theta activity, at times rhythmic and sharply contoured. Clinically, the patient has right arm repetitive movements and circular movements of the right finger. The seizure lasts for 1 - 2 minutes in duration.    d1 15:44:42		Seizure onset  d1 15:44:49		L slowing  d1 15:44:50		Chewing artifact  d1 15:44:52		L hemispheric polymorphic delta slowing  d1 15:44:53		Looks up  d1 15:44:55		Opening and closing mouth  d1 15:44:59		F7F9 spike wave discharges  d1 15:45:01		Rhythmic delta 2 - 3 Hz in the left hemisphere  d1 15:45:02		Reaches for ipad  d1 15:45:05		Right hand sharking and right finger movements  d1 15:45:11		Right arm rotatory movements  d1 15:45:14		Seizure evolving in the left frontotemporal region  d1 15:45:22		Making circular motion with right index finger  d1 15:45:23		Sharply contoured delta and theta in the left hemisphere  d1 15:45:36		Opening and closing mouth  d1 15:45:43		Seizure offset  d1 15:45:52		Looking at ipad    Other Clinical Events:  Button presses at 18:09 and 07:25 without electrographic seizures. At times, patient is explaining that she had been experiencing feelings of warm sensation through her body and a rising sensation during the recording. There were no clear electrographic abnormalities captured during these events.    Activation Procedures:   -Hyperventilation was not performed.    -Photic stimulation was not performed.     Artifacts:  Intermittent myogenic and movement artifacts were noted.    ECG:  The heart rate on single channel ECG was predominantly between 60 - 70 BPM.    ASMs:  Depakote 250 mg BID    EEG Classification / Summary:  Abnormal EEG study in the awake / drowsy / asleep states  -One focal onset seizures, left frontotemporal, 15:44 on 07/01  -Spikes, left frontotemporal, frequent  -Spikes, right frontotemporal, rare    -----------------------------------------------------------------------------------------------------    Clinical Impression:  Abnormal EEG study  Findings indicate a focal epilepsy syndrome with one focal onset seizure from the left frontotemporal region captured at 15:44 on 07/01.  Further risk of focal onset seizures from the left > right frontotemporal regions.        -------------------------------------------------------------------------------------------------------  EEG reading room: [phone]    After-hours epilepsy service: [phone]    Haseeb Billy DO  Epilepsy Fellow

## 2025-07-02 NOTE — PROGRESS NOTE ADULT - ATTENDING COMMENTS
bitemporal epilepsy  will monitor for event capture  one electroclinical seizure LT onset( possible insula by prior semiologies)

## 2025-07-03 ENCOUNTER — TRANSCRIPTION ENCOUNTER (OUTPATIENT)
Age: 60
End: 2025-07-03

## 2025-07-03 PROCEDURE — 93010 ELECTROCARDIOGRAM REPORT: CPT

## 2025-07-03 PROCEDURE — 95720 EEG PHY/QHP EA INCR W/VEEG: CPT

## 2025-07-03 PROCEDURE — 99231 SBSQ HOSP IP/OBS SF/LOW 25: CPT

## 2025-07-03 RX ORDER — BRIVARACETAM 75 MG/1
100 TABLET, FILM COATED ORAL ONCE
Refills: 0 | Status: DISCONTINUED | OUTPATIENT
Start: 2025-07-03 | End: 2025-07-03

## 2025-07-03 RX ORDER — BRIVARACETAM 75 MG/1
100 TABLET, FILM COATED ORAL EVERY 12 HOURS
Refills: 0 | Status: DISCONTINUED | OUTPATIENT
Start: 2025-07-04 | End: 2025-07-04

## 2025-07-03 RX ORDER — CENOBAMATE 150-200 MG
1 KIT ORAL
Qty: 28 | Refills: 0
Start: 2025-07-03 | End: 2025-07-30

## 2025-07-03 RX ORDER — BRIVARACETAM 75 MG/1
1 TABLET, FILM COATED ORAL
Qty: 60 | Refills: 0
Start: 2025-07-03 | End: 2025-08-01

## 2025-07-03 RX ORDER — CENOBAMATE 150-200 MG
12.5 KIT ORAL AT BEDTIME
Refills: 0 | Status: DISCONTINUED | OUTPATIENT
Start: 2025-07-03 | End: 2025-07-04

## 2025-07-03 RX ORDER — LACOSAMIDE 150 MG/1
200 TABLET, FILM COATED ORAL ONCE
Refills: 0 | Status: DISCONTINUED | OUTPATIENT
Start: 2025-07-03 | End: 2025-07-03

## 2025-07-03 RX ORDER — BRIVARACETAM 75 MG/1
100 TABLET, FILM COATED ORAL
Refills: 0 | Status: DISCONTINUED | OUTPATIENT
Start: 2025-07-03 | End: 2025-07-03

## 2025-07-03 RX ADMIN — Medication 40 MILLIGRAM(S): at 05:30

## 2025-07-03 RX ADMIN — NICOTINE POLACRILEX 1 PATCH: 4 GUM, CHEWING ORAL at 07:00

## 2025-07-03 RX ADMIN — LOSARTAN POTASSIUM 50 MILLIGRAM(S): 100 TABLET, FILM COATED ORAL at 05:30

## 2025-07-03 RX ADMIN — NICOTINE POLACRILEX 1 PATCH: 4 GUM, CHEWING ORAL at 17:00

## 2025-07-03 RX ADMIN — BRIVARACETAM 240 MILLIGRAM(S): 75 TABLET, FILM COATED ORAL at 13:55

## 2025-07-03 RX ADMIN — ESCITALOPRAM OXALATE 20 MILLIGRAM(S): 20 TABLET ORAL at 11:55

## 2025-07-03 RX ADMIN — NICOTINE POLACRILEX 1 PATCH: 4 GUM, CHEWING ORAL at 17:18

## 2025-07-03 RX ADMIN — Medication 40 MILLIGRAM(S): at 21:29

## 2025-07-03 RX ADMIN — BRIVARACETAM 240 MILLIGRAM(S): 75 TABLET, FILM COATED ORAL at 17:19

## 2025-07-03 RX ADMIN — NICOTINE POLACRILEX 1 PATCH: 4 GUM, CHEWING ORAL at 19:32

## 2025-07-03 RX ADMIN — Medication 75 MILLILITER(S): at 13:46

## 2025-07-03 RX ADMIN — ENOXAPARIN SODIUM 40 MILLIGRAM(S): 100 INJECTION SUBCUTANEOUS at 05:29

## 2025-07-03 RX ADMIN — Medication 175 MICROGRAM(S): at 05:30

## 2025-07-03 NOTE — EEG REPORT - NS EEG TEXT BOX
KARLI CORDOVA [facility]-[id number]     Study Date: 07-02-25 08:00 to 07-03-25 08:00  Duration: 24 hrs    --------------------------------------------------------------------------------------------------  History:  CC/ HPI Patient is a 59y old  Female who presents with a chief complaint of Seizure characterization (03 Jul 2025 10:16)    MEDICATIONS  (STANDING):  brivaracetam  IVPB 100 milliGRAM(s) IV Intermittent <User Schedule>  cenobamate 12.5 milliGRAM(s) Oral at bedtime  enoxaparin Injectable 40 milliGRAM(s) SubCutaneous every 24 hours  escitalopram 20 milliGRAM(s) Oral daily  famotidine    Tablet 40 milliGRAM(s) Oral at bedtime  hydrochlorothiazide 12.5 milliGRAM(s) Oral daily  levothyroxine 175 MICROGram(s) Oral daily  losartan 50 milliGRAM(s) Oral daily  nicotine - 21 mG/24Hr(s) Patch 1 Patch Transdermal every 24 hours  pantoprazole    Tablet 40 milliGRAM(s) Oral before breakfast  sodium chloride 0.9%. 1000 milliLiter(s) (75 mL/Hr) IV Continuous <Continuous>    --------------------------------------------------------------------------------------------------  Study Interpretation:    [[[Abbreviation Key:  PDR=alpha rhythm/posterior dominant rhythm. A-P=anterior posterior gradient.  Amplitude: ‘very low’:<20; ‘low’:20-50; ‘medium’:; ‘high’:>200uV.  Persistence for periodic/rhythmic patterns (% of epoch) ‘rare’:<1%; ‘occasional’:1-10%; ‘frequent’:10-50%; ‘abundant’:50-90%; ‘continuous’:>90%.  Persistence for sporadic discharges: ‘rare’:<1/hr; ‘occasional’:1/min-1/hr; ‘frequent’:>1/min; ‘abundant’:>1/10 sec.  GRDA=generalized rhythmic delta activity; FIRDA=frontal intermittent GRDA; LRDA=lateralized rhythmic delta activity; TIRDA=temporal intermittent rhythmic delta activity;  LPD=PLED=lateralized periodic discharges; GPD=generalized periodic discharges; BiPDs=BiPLEDs=bilateral independent periodic epileptiform discharges; SIRPID=stimulus induced rhythmic, periodic, or ictal appearing discharges; BIRDs=brief potentially ictal rhythmic discharges >4 Hz, lasting .5-10s; PFA=paroxysmal bursts of beta/gamma; LVFA=low voltage fast activity.  Modifiers: +F=with fast component; +S=with spike component; +R=with rhythmic component.  S-B=burst suppression pattern.  Max=maximal. N1-drowsy; N2-stage II sleep; N3-slow wave sleep. SSS/BETS=small sharp spikes/benign epileptiform transients of sleep. HV=hyperventilation; PS=photic stimulation]]]    FINDINGS:      Background:  Continuity: Continuous  Symmetry: Symmetric  PDR: 11 Hz activity, with amplitude to 40 uV, that attenuated to eye opening.    Reactivity: Present  Voltage: Normal (between 20-150uV)  Anterior Posterior Gradient: Present  Other background findings: None  Breach: Absent    Background Slowing:  Generalized slowing: None was present.  Focal slowing: None was present.    State Changes:   -Drowsiness noted with increased slowing, attenuation of fast activity, vertex transients.  -Present with N2 sleep transients with symmetric spindles and K-complexes.    Interictal Findings:  Frequent left frontotemporal (F7 maximum) spike and wave discharges were present.  Occasional right frontotemporal (F8 maximum) spike and wave discharges were present.  Intermittent LRDA in the right temporal region at frequency of 2 - 3 Hz was present.    Electrographic and Electroclinical seizures:  None    Other Clinical Events:  Button presses at 10:02, 16:19 and 18:06 for episodes of "feeling of a sensation through her body" and warmth sensation. During the button press at 16:19, there is LRDA in the right temporal region at frequency of 2 - 3 Hz with spike wave discharges in the right frontotemporal region. There were no electrographic seizures.    Activation Procedures:   -Hyperventilation was not performed.    -Photic stimulation was not performed.     Artifacts:  Intermittent myogenic and movement artifacts were noted.    ECG:  The heart rate on single channel ECG was predominantly between 60 - 70 BPM.    ASMs:  Alprazolam 0.25 mg qhs    EEG Classification / Summary:  Abnormal EEG study in the awake / drowsy / asleep states  -Spikes, left frontotemporal, frequent  -Spikes, right frontotemporal, occasional  -LRDA, right temporal, intermittent, 2 - 3 Hz  -Episodes of auras without any electrographic seizures    -----------------------------------------------------------------------------------------------------    Clinical Impression:  Abnormal EEG study  Risk of focal onset seizures from the bilateral frontotemporal regions.  There were no seizures recorded.      Episodes at 10:02, 16:19 and 18:06 for auras of feeling of a warm sensation with no electrographic seizures.    This is a preliminary impression still pending attendings attestation.     -------------------------------------------------------------------------------------------------------  EEG reading room: [phone]    After-hours epilepsy service: [phone]    Haseeb Billy DO  Epilepsy Fellow KARLI CORDOVA [facility]-[id number]     Study Date: 07-02-25 08:00 to 07-03-25 08:00  Duration: 24 hrs    --------------------------------------------------------------------------------------------------  History:  CC/ HPI Patient is a 59y old  Female who presents with a chief complaint of Seizure characterization (03 Jul 2025 10:16)    MEDICATIONS  (STANDING):  brivaracetam  IVPB 100 milliGRAM(s) IV Intermittent <User Schedule>  cenobamate 12.5 milliGRAM(s) Oral at bedtime  enoxaparin Injectable 40 milliGRAM(s) SubCutaneous every 24 hours  escitalopram 20 milliGRAM(s) Oral daily  famotidine    Tablet 40 milliGRAM(s) Oral at bedtime  hydrochlorothiazide 12.5 milliGRAM(s) Oral daily  levothyroxine 175 MICROGram(s) Oral daily  losartan 50 milliGRAM(s) Oral daily  nicotine - 21 mG/24Hr(s) Patch 1 Patch Transdermal every 24 hours  pantoprazole    Tablet 40 milliGRAM(s) Oral before breakfast  sodium chloride 0.9%. 1000 milliLiter(s) (75 mL/Hr) IV Continuous <Continuous>    --------------------------------------------------------------------------------------------------  Study Interpretation:    [[[Abbreviation Key:  PDR=alpha rhythm/posterior dominant rhythm. A-P=anterior posterior gradient.  Amplitude: ‘very low’:<20; ‘low’:20-50; ‘medium’:; ‘high’:>200uV.  Persistence for periodic/rhythmic patterns (% of epoch) ‘rare’:<1%; ‘occasional’:1-10%; ‘frequent’:10-50%; ‘abundant’:50-90%; ‘continuous’:>90%.  Persistence for sporadic discharges: ‘rare’:<1/hr; ‘occasional’:1/min-1/hr; ‘frequent’:>1/min; ‘abundant’:>1/10 sec.  GRDA=generalized rhythmic delta activity; FIRDA=frontal intermittent GRDA; LRDA=lateralized rhythmic delta activity; TIRDA=temporal intermittent rhythmic delta activity;  LPD=PLED=lateralized periodic discharges; GPD=generalized periodic discharges; BiPDs=BiPLEDs=bilateral independent periodic epileptiform discharges; SIRPID=stimulus induced rhythmic, periodic, or ictal appearing discharges; BIRDs=brief potentially ictal rhythmic discharges >4 Hz, lasting .5-10s; PFA=paroxysmal bursts of beta/gamma; LVFA=low voltage fast activity.  Modifiers: +F=with fast component; +S=with spike component; +R=with rhythmic component.  S-B=burst suppression pattern.  Max=maximal. N1-drowsy; N2-stage II sleep; N3-slow wave sleep. SSS/BETS=small sharp spikes/benign epileptiform transients of sleep. HV=hyperventilation; PS=photic stimulation]]]    FINDINGS:      Background:  Continuity: Continuous  Symmetry: Symmetric  PDR: 11 Hz activity, with amplitude to 40 uV, that attenuated to eye opening.    Reactivity: Present  Voltage: Normal (between 20-150uV)  Anterior Posterior Gradient: Present  Other background findings: None  Breach: Absent    Background Slowing:  Generalized slowing: None was present.  Focal slowing: None was present.    State Changes:   -Drowsiness noted with increased slowing, attenuation of fast activity, vertex transients.  -Present with N2 sleep transients with symmetric spindles and K-complexes.    Interictal Findings:  Frequent left frontotemporal (F7 maximum) spike and wave discharges were present.  Occasional right frontotemporal (F8 maximum) spike and wave discharges were present.  Intermittent LRDA in the right temporal region at frequency of 2 - 3 Hz was present.    Electrographic and Electroclinical seizures:  None    Other Clinical Events:  Button presses at 10:02, 16:19 and 18:06 for episodes of "feeling of a sensation through her body" and warmth sensation. During the button press at 16:19, there is LRDA in the right temporal region at frequency of 2 - 3 Hz with spike wave discharges in the right frontotemporal region. There were no electrographic seizures.    Activation Procedures:   -Hyperventilation was not performed.    -Photic stimulation was not performed.     Artifacts:  Intermittent myogenic and movement artifacts were noted.    ECG:  The heart rate on single channel ECG was predominantly between 60 - 70 BPM.    ASMs:  Alprazolam 0.25 mg qhs    EEG Classification / Summary:  Abnormal EEG study in the awake / drowsy / asleep states  -Spikes, left frontotemporal, frequent  -Spikes, right frontotemporal, occasional  -LRDA, right temporal, intermittent, 2 - 3 Hz  -Episodes of auras without any electrographic seizures    -----------------------------------------------------------------------------------------------------    Clinical Impression:  Abnormal EEG study  Risk of focal onset seizures from the bilateral frontotemporal regions.  There were no seizures recorded.      Episodes at 10:02, 16:19 and 18:06 for auras of feeling of a warm sensation with no electrographic seizures.        -------------------------------------------------------------------------------------------------------  EEG reading room: [phone]    After-hours epilepsy service: [phone]    Haseeb Billy DO  Epilepsy Fellow

## 2025-07-03 NOTE — DISCHARGE NOTE PROVIDER - NSDCFUADDAPPT_GEN_ALL_CORE_FT
Please call [facility] for SPEECH THERAPY:  [address] [place] [address] at [phone].

## 2025-07-03 NOTE — DISCHARGE NOTE PROVIDER - NSDCMRMEDTOKEN_GEN_ALL_CORE_FT
Divalproex Sodium 500 MG Oral Tablet Delayed Release: One tablet twice daily  escitalopram 20 mg oral tablet: 1 tab(s) orally once a day  losartan-hydrochlorothiazide 50mg-12.5mg oral tablet: 1 tab(s) orally once a day  Nicotine 21 MG/24HR Transdermal Patch 24 Hour: One patch daily  omeprazole 20 mg oral delayed release capsule: 1 cap(s) orally once a day before breakfast  Unithroid 175 mcg (0.175 mg) oral tablet: 1 tab(s) orally once a day  Vimpat 200 mg oral tablet: 1 tab(s) orally 2 times a day MDD: 400mg  Xcopri Titration Pack 12.5 mg-25 mg oral tablet: 1 tab(s) orally once a day MDD: 25mg   Briviact 100 mg oral tablet: 1 tab(s) orally 2 times a day MDD: 200mg  escitalopram 20 mg oral tablet: 1 tab(s) orally once a day  losartan-hydrochlorothiazide 50mg-12.5mg oral tablet: 1 tab(s) orally once a day  Nicotine 21 MG/24HR Transdermal Patch 24 Hour: 1 application transdermal once a day One patch daily  omeprazole 20 mg oral delayed release capsule: 1 cap(s) orally once a day before breakfast  Unithroid 175 mcg (0.175 mg) oral tablet: 1 tab(s) orally once a day  Xcopri Titration Pack 12.5 mg-25 mg oral tablet: 1 tab(s) orally once a day (at bedtime) MDD: 25mg

## 2025-07-03 NOTE — PROGRESS NOTE ADULT - SUBJECTIVE AND OBJECTIVE BOX
THE PATIENT WAS SEEN AND EXAMINED BY ME WITH THE HOUSESTAFF DURING MORNING ROUNDS.   HPI: Ms. 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 History obtained fro  patient and  at the bedside as well as from chart review. Patient reports that her seizure episodes began approximately 5 years ago. Seizure semiology is described as staring spells with inability to speak, lasting 1-2 minutes at a time. Patient reports that beginning several months ago, her episodes have also included shaking of her right hand. Patient is alert during these episodes and able to remember them. She also experiences auras, described as feeling of warmth and positivity, as well as an out-of-body experience. Patient reports 1 lifetime episode of GTC in 2023 after heavy alcohol use. Of note, patient also reports frequent staring episodes that occurred in childhood, however states she was never diagnosed with epilepsy as a child. Patient states she is currently driving (she has received clearance from her neurologist in the past for this). Patient reports that her seizure episodes have been increasing in frequency over the past 9 months. She recently saw LANETTE Reyes who referred her for elective EMU admission for event characterization and medication management.      ROS: Otherwise negative.     SUBJECTIVE: Patient received xanax 0.25mg overnight for restlessness.  No new neurologic complaints.      enoxaparin Injectable 40 milliGRAM(s) SubCutaneous every 24 hours  escitalopram 20 milliGRAM(s) Oral daily  famotidine    Tablet 40 milliGRAM(s) Oral at bedtime  hydrochlorothiazide 12.5 milliGRAM(s) Oral daily  levothyroxine 175 MICROGram(s) Oral daily  losartan 50 milliGRAM(s) Oral daily  midazolam Injectable 2 milliGRAM(s) IV Push once PRN  nicotine - 21 mG/24Hr(s) Patch 1 Patch Transdermal every 24 hours  pantoprazole    Tablet 40 milliGRAM(s) Oral before breakfast  valproate sodium Injectable 1000 milliGRAM(s) IV Push once PRN      Vital Signs Last 24 Hrs  T(C): 36.6 (03 Jul 2025 04:34), Max: 36.8 (02 Jul 2025 14:54)  T(F): 97.9 (03 Jul 2025 04:34), Max: 98.3 (02 Jul 2025 16:56)  HR: 55 (03 Jul 2025 04:34) (55 - 70)  BP: 151/80 (03 Jul 2025 04:34) (122/77 - 151/80)  BP(mean): --  RR: 18 (03 Jul 2025 04:34) (18 - 19)  SpO2: 97% (03 Jul 2025 04:34) (94% - 97%)  Patient On (Oxygen Delivery Method): room air        Physical Exam: Neurological Exam:  	Mental Status: Oriented to self, date and place. Attention intact. Speech fluent. Follows commands  	Cranial Nerves: PERRL, EOMI, no nystagmus or diplopia. No facial asymmetry.   	Motor: moving all 4 extremities equally at least antigravity  	Tone: normal.                       	Tremor: No resting, postural or action tremor.  No myoclonus.  	Sensation: intact to light touch    LABS:                        13.8   11.48 )-----------( 154      ( 02 Jul 2025 07:13 )             41.5    07-02    139  |  103  |  18  ----------------------------<  85  4.3   |  24  |  0.85    Ca    9.7      02 Jul 2025 07:12             IMAGING/EEG  EEG Report 07-01-25 08:00 to 07-02-25 08:00  EEG Classification / Summary:  Abnormal EEG study in the awake / drowsy / asleep states  -One focal onset seizures, left frontotemporal, 15:44 on 07/01  -Spikes, left frontotemporal, frequent  -Spikes, right frontotemporal, rare      Clinical Impression:  Abnormal EEG study  Findings indicate a focal epilepsy syndrome with one focal onset seizure from the left frontotemporal region captured at 15:44 on 07/01.  Further risk of focal onset seizures from the left > right frontotemporal regions.

## 2025-07-03 NOTE — DISCHARGE NOTE PROVIDER - CARE PROVIDER_API CALL
Alyssa Reyes  Nurse Practitioner Family  [address], [address]  [place] [address]  Phone: [phone]  Fax: [phone]  Follow Up Time:

## 2025-07-03 NOTE — DISCHARGE NOTE PROVIDER - HOSPITAL COURSE
HPI: Ms. 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 History obtained fro  patient and  at the bedside as well as from chart review. Patient reports that her seizure episodes began approximately 5 years ago. Seizure semiology is described as staring spells with inability to speak, lasting 1-2 minutes at a time. Patient reports that beginning several months ago, her episodes have also included shaking of her right hand. Patient is alert during these episodes and able to remember them. She also experiences auras, described as feeling of warmth and positivity, as well as an out-of-body experience. Patient reports 1 lifetime episode of GTC in 2023 after heavy alcohol use. Of note, patient also reports frequent staring episodes that occurred in childhood, however states she was never diagnosed with epilepsy as a child. Patient states she is currently driving (she has received clearance from her neurologist in the past for this). Patient reports that her seizure episodes have been increasing in frequency over the past 9 months. She recently saw LANETTE Reyes who referred her for elective EMU admission for event characterization and medication management.    EEG Report 07-01-25 08:00 to 07-02-25 08:00  EEG Classification / Summary:  Abnormal EEG study in the awake / drowsy / asleep states  -One focal onset seizures, left frontotemporal, 15:44 on 07/01  -Spikes, left frontotemporal, frequent  -Spikes, right frontotemporal, rare      Clinical Impression:  Abnormal EEG study  Findings indicate a focal epilepsy syndrome with one focal onset seizure from the left frontotemporal region captured at 15:44 on 07/01.  Further risk of focal onset seizures from the left > right frontotemporal regions.      Hospital Course: Patient was an elective admission to EMU for event characterization and medication management. HPI: Ms. 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 History obtained fro  patient and  at the bedside as well as from chart review. Patient reports that her seizure episodes began approximately 5 years ago. Seizure semiology is described as staring spells with inability to speak, lasting 1-2 minutes at a time. Patient reports that beginning several months ago, her episodes have also included shaking of her right hand. Patient is alert during these episodes and able to remember them. She also experiences auras, described as feeling of warmth and positivity, as well as an out-of-body experience. Patient reports 1 lifetime episode of GTC in 2023 after heavy alcohol use. Of note, patient also reports frequent staring episodes that occurred in childhood, however states she was never diagnosed with epilepsy as a child. Patient states she is currently driving (she has received clearance from her neurologist in the past for this). Patient reports that her seizure episodes have been increasing in frequency over the past 9 months. She recently saw LANETTE Reyes who referred her for elective EMU admission for event characterization and medication management.    EEG Report 07-01-25 08:00 to 07-02-25 08:00  EEG Classification / Summary:  Abnormal EEG study in the awake / drowsy / asleep states  -One focal onset seizures, left frontotemporal, 15:44 on 07/01  -Spikes, left frontotemporal, frequent  -Spikes, right frontotemporal, rare      Clinical Impression:  Abnormal EEG study  Findings indicate a focal epilepsy syndrome with one focal onset seizure from the left frontotemporal region captured at 15:44 on 07/01.  Further risk of focal onset seizures from the left > right frontotemporal regions.      Hospital Course: Patient was an elective admission to EMU for event characterization and medication management. She was deemed stable for discharge home on Briviac 100 mg BID and Xcopri.

## 2025-07-03 NOTE — CHART NOTE - NSCHARTNOTEFT_GEN_A_CORE
*****NEUROLOGY ACP EVENT NOTE*****  HPI: Ms. 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 History obtained fro  patient and  at the bedside as well as from chart review. Patient reports that her seizure episodes began approximately 5 years ago. Seizure semiology is described as staring spells with inability to speak, lasting 1-2 minutes at a time. Patient reports that beginning several months ago, her episodes have also included shaking of her right hand. Patient is alert during these episodes and able to remember them. She also experiences auras, described as feeling of warmth and positivity, as well as an out-of-body experience. Patient reports 1 lifetime episode of GTC in 2023 after heavy alcohol use. Of note, patient also reports frequent staring episodes that occurred in childhood, however states she was never diagnosed with epilepsy as a child. Patient states she is currently driving (she has received clearance from her neurologist in the past for this). Patient reports that her seizure episodes have been increasing in frequency over the past 9 months. She recently saw NP Alyssa Reyes who referred her for elective EMU admission for event characterization and medication management.        EVENT: Notified by RN that patient experienced reaction during lacosamide IV infusion. Patient states symptoms started within 1-2 minutes of starting infusion, describes dizziness, nausea and flushed.  Infusion stopped and patient states symptoms resolved almost immediately. Now reports feeling at baseline. Denies chest pain, SOB or rash. Vital signs stable.       ROS negative unless otherwise noted.     PHYSICAL EXAM:   Vital Signs Last 24 Hrs  T(C): 36.7 (03 Jul 2025 12:20), Max: 36.8 (02 Jul 2025 14:54)  T(F): 98 (03 Jul 2025 12:20), Max: 98.3 (02 Jul 2025 16:56)  HR: 62 (03 Jul 2025 12:20) (55 - 65)  BP: 133/84 (03 Jul 2025 12:20) (122/77 - 151/80)  BP(mean): --  RR: 18 (03 Jul 2025 12:20) (18 - 18)  SpO2: 96% (03 Jul 2025 12:20) (94% - 97%)  Patient On (Oxygen Delivery Method): room air      General: Alert and Oriented x 3. No acute Distress.     NEUROLOGICAL EXAM:	  Mental Status: Oriented to self, date and place. Attention intact. Speech fluent. Follows commands  Cranial Nerves: PERRL, EOMI, no nystagmus or diplopia. No facial asymmetry.   Motor: moving all 4 extremities equally at least antigravity  Tone: normal.                       Tremor: No resting, postural or action tremor.  No myoclonus.  Sensation: intact to light touch    LABS:                        13.8   11.48 )-----------( 154      ( 02 Jul 2025 07:13 )             41.5    07-02    139  |  103  |  18  ----------------------------<  85  4.3   |  24  |  0.85    Ca    9.7      02 Jul 2025 07:12        Impression: Adverse reaction to IV lacosamide infusion. Patient admitted with increased seizure frequency i/s/o focal bitemporal epilepsy for event characterization and medication adjustment      Plan:  - Stop Lacosamide  - Administer Briviact 100mg IV now  - Start NS IV fluids  - Nursing Interventions: Continue Neurochecks q4h      Time spent with patient:  25 minutes  Event discussed with: Dr. Martinez *****NEUROLOGY ACP EVENT NOTE*****  HPI: Ms. 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 History obtained fro  patient and  at the bedside as well as from chart review. Patient reports that her seizure episodes began approximately 5 years ago. Seizure semiology is described as staring spells with inability to speak, lasting 1-2 minutes at a time. Patient reports that beginning several months ago, her episodes have also included shaking of her right hand. Patient is alert during these episodes and able to remember them. She also experiences auras, described as feeling of warmth and positivity, as well as an out-of-body experience. Patient reports 1 lifetime episode of GTC in 2023 after heavy alcohol use. Of note, patient also reports frequent staring episodes that occurred in childhood, however states she was never diagnosed with epilepsy as a child. Patient states she is currently driving (she has received clearance from her neurologist in the past for this). Patient reports that her seizure episodes have been increasing in frequency over the past 9 months. She recently saw NP Alyssa Reyes who referred her for elective EMU admission for event characterization and medication management.        EVENT: Notified by RN that patient experienced reaction during lacosamide IV infusion. Patient states symptoms started within 1-2 minutes of starting infusion - describes dizziness, nausea and being flushed.  Infusion stopped and patient states symptoms resolved almost immediately. Now reports feeling at baseline. Denies chest pain, SOB or rash. Vital signs stable.       ROS negative unless otherwise noted.     PHYSICAL EXAM:   Vital Signs Last 24 Hrs  T(C): 36.7 (03 Jul 2025 12:20), Max: 36.8 (02 Jul 2025 14:54)  T(F): 98 (03 Jul 2025 12:20), Max: 98.3 (02 Jul 2025 16:56)  HR: 62 (03 Jul 2025 12:20) (55 - 65)  BP: 133/84 (03 Jul 2025 12:20) (122/77 - 151/80)  BP(mean): --  RR: 18 (03 Jul 2025 12:20) (18 - 18)  SpO2: 96% (03 Jul 2025 12:20) (94% - 97%)  Patient On (Oxygen Delivery Method): room air      General: Alert and Oriented x 3. No acute Distress.     NEUROLOGICAL EXAM:	  Mental Status: Oriented to self, date and place. Attention intact. Speech fluent. Follows commands  Cranial Nerves: PERRL, EOMI, no nystagmus or diplopia. No facial asymmetry.   Motor: moving all 4 extremities equally at least antigravity  Tone: normal.                       Tremor: No resting, postural or action tremor.  No myoclonus.  Sensation: intact to light touch    LABS:                        13.8   11.48 )-----------( 154      ( 02 Jul 2025 07:13 )             41.5    07-02    139  |  103  |  18  ----------------------------<  85  4.3   |  24  |  0.85    Ca    9.7      02 Jul 2025 07:12        Impression: Adverse reaction during IV lacosamide infusion. Patient admitted with increased seizure frequency i/s/o focal bitemporal epilepsy for event characterization and medication adjustment      Plan:  - Stop Lacosamide  - Administer Briviact 100mg IV now  - Start NS IV fluids  - Nursing Interventions: Continue Neurochecks q4h      Time spent with patient:  25 minutes  Event discussed with: Dr. Martinez

## 2025-07-03 NOTE — DISCHARGE NOTE PROVIDER - NSDCFUSCHEDAPPT_GEN_ALL_CORE_FT
[facility] Physician [facility]  NEUROLOGY [address] [place] [address]  Scheduled Appointment: 07/22/2025

## 2025-07-03 NOTE — DISCHARGE NOTE PROVIDER - NSDCCPCAREPLAN_GEN_ALL_CORE_FT
PRINCIPAL DISCHARGE DIAGNOSIS  Diagnosis: Seizure  Assessment and Plan of Treatment: You are at risk of having seizures. Please take your Xcopri and Briviac as prescribed.

## 2025-07-03 NOTE — PROGRESS NOTE ADULT - ASSESSMENT
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 Seizure semiology described as staring episodes with inability to speak, recently with R hand shaking. She also experiences auras of warmth and positive sensation, as well as out of body experience. Patient reports history of staring episodes in childhood. Patient reports seizure frequency has been increasing over past 9 months.    Impression: Focal epilepsy with increasing seizure frequency over recent months in the setting of bitemporal epilepsy, presenting for EMU admission for event characterization and medication adjustment.    Plan:  [x] Continue vEEG monitoring   [x] Hold home Depakote  [x] Outpatient MRI Brain reviewed, no need for further imaging inpatient  [x] Diet: Regular  [x] DVT ppx: Lovenox  [x] Dispo: Home when medically cleared     CORE MEASURES:        AED levels [] Sent [] Pending [x] Resulted     LFTs [x] normal [] elevated      Plan and education provided to [x] patient []family at bedside [] awaiting for family     Seizure Semiology  [] Tonic clonic  [] Clonic  [] Tonic  [] Unresponsive  [] Focal with impaired awareness  [x] Focal without impaired awareness   59F PMHx HTN, hypothyroidism, depression, ITP, ICA aneurysm, active smoker, focal epilepsy on depakote 500 mg BID presented for elective EMU admission. Seizure semiology described as staring episodes with inability to speak, recently with R hand shaking. She also experiences auras of warmth and positive sensation, as well as out of body experience. Patient reports history of staring episodes in childhood. Patient reports seizure frequency has been increasing over past 9 months.    Impression: Focal epilepsy with increasing seizure frequency over recent months in the setting of bitemporal epilepsy, presenting for EMU admission for event characterization and medication adjustment.    Plan:  [x] Continue vEEG monitoring   [] Administer Lacosamide 200mg IV q12h x 2 doses, then continue Lacosamide PO 200mg BID  [] Start Xcopri 25mg qhs titration pack  [x] Hold home Depakote  [x] Outpatient MRI Brain reviewed, no need for further imaging inpatient  [x] Diet: Regular  [x] DVT ppx: Lovenox  [x] Dispo: Home when medically cleared     CORE MEASURES:        AED levels [] Sent [] Pending [x] Resulted     LFTs [x] normal [] elevated      Plan and education provided to [x] patient []family at bedside [] awaiting for family     Seizure Semiology  [] Tonic clonic  [] Clonic  [] Tonic  [] Unresponsive  [] Focal with impaired awareness  [x] Focal without impaired awareness   59F PMHx HTN, hypothyroidism, depression, ITP, ICA aneurysm, active smoker, focal epilepsy on depakote 500 mg BID presented for elective EMU admission. Seizure semiology described as staring episodes with inability to speak, recently with R hand shaking. She also experiences auras of warmth and positive sensation, as well as out of body experience. Patient reports history of staring episodes in childhood. Patient reports seizure frequency has been increasing over past 9 months.    Impression: Focal epilepsy with increasing seizure frequency over recent months in the setting of bitemporal epilepsy, presenting for EMU admission for event characterization and medication adjustment.    Plan:  [x] Continue vEEG monitoring   [] Administer Lacosamide 200mg IV q12h x 2 doses, then continue Lacosamide PO 200mg BID  [] Start Xcopri 12.5-25mg qhs titration pack  [x] Hold home Depakote  [x] Outpatient MRI Brain reviewed, no need for further imaging inpatient  [x] Diet: Regular  [x] DVT ppx: Lovenox  [x] Dispo: Home when medically cleared     CORE MEASURES:        AED levels [] Sent [] Pending [x] Resulted     LFTs [x] normal [] elevated      Plan and education provided to [x] patient []family at bedside [] awaiting for family     Seizure Semiology  [] Tonic clonic  [] Clonic  [] Tonic  [] Unresponsive  [] Focal with impaired awareness  [x] Focal without impaired awareness

## 2025-07-04 ENCOUNTER — TRANSCRIPTION ENCOUNTER (OUTPATIENT)
Age: 60
End: 2025-07-04

## 2025-07-04 VITALS
TEMPERATURE: 98 F | RESPIRATION RATE: 18 BRPM | HEART RATE: 60 BPM | DIASTOLIC BLOOD PRESSURE: 65 MMHG | SYSTOLIC BLOOD PRESSURE: 110 MMHG | OXYGEN SATURATION: 98 %

## 2025-07-04 PROCEDURE — 80048 BASIC METABOLIC PNL TOTAL CA: CPT

## 2025-07-04 PROCEDURE — 95700 EEG CONT REC W/VID EEG TECH: CPT

## 2025-07-04 PROCEDURE — 85025 COMPLETE CBC W/AUTO DIFF WBC: CPT

## 2025-07-04 PROCEDURE — 95716 VEEG EA 12-26HR CONT MNTR: CPT

## 2025-07-04 PROCEDURE — 80053 COMPREHEN METABOLIC PANEL: CPT

## 2025-07-04 PROCEDURE — 80164 ASSAY DIPROPYLACETIC ACD TOT: CPT

## 2025-07-04 PROCEDURE — C9254: CPT

## 2025-07-04 PROCEDURE — 93005 ELECTROCARDIOGRAM TRACING: CPT

## 2025-07-04 PROCEDURE — 85027 COMPLETE CBC AUTOMATED: CPT

## 2025-07-04 PROCEDURE — 84100 ASSAY OF PHOSPHORUS: CPT

## 2025-07-04 PROCEDURE — 95718 EEG PHYS/QHP 2-12 HR W/VEEG: CPT

## 2025-07-04 PROCEDURE — 95713 VEEG 2-12 HR CONT MNTR: CPT

## 2025-07-04 PROCEDURE — 83735 ASSAY OF MAGNESIUM: CPT

## 2025-07-04 RX ORDER — ALPRAZOLAM 0.5 MG
0.25 TABLET, EXTENDED RELEASE 24 HR ORAL ONCE
Refills: 0 | Status: DISCONTINUED | OUTPATIENT
Start: 2025-07-04 | End: 2025-07-04

## 2025-07-04 RX ADMIN — Medication 175 MICROGRAM(S): at 05:39

## 2025-07-04 RX ADMIN — Medication 0.25 MILLIGRAM(S): at 01:50

## 2025-07-04 RX ADMIN — ENOXAPARIN SODIUM 40 MILLIGRAM(S): 100 INJECTION SUBCUTANEOUS at 05:39

## 2025-07-04 RX ADMIN — ESCITALOPRAM OXALATE 20 MILLIGRAM(S): 20 TABLET ORAL at 11:16

## 2025-07-04 RX ADMIN — Medication 40 MILLIGRAM(S): at 05:38

## 2025-07-04 RX ADMIN — LOSARTAN POTASSIUM 50 MILLIGRAM(S): 100 TABLET, FILM COATED ORAL at 05:38

## 2025-07-04 RX ADMIN — BRIVARACETAM 100 MILLIGRAM(S): 75 TABLET, FILM COATED ORAL at 05:37

## 2025-07-04 NOTE — DISCHARGE NOTE NURSING/CASE MANAGEMENT/SOCIAL WORK - PATIENT PORTAL LINK FT
You can access the FollowMyHealth Patient Portal offered by [facility] by registering at the following website: [email or web address]. By joining [employer]’s FollowMyHealth portal, you will also be able to view your health information using other applications (apps) compatible with our system.

## 2025-07-04 NOTE — EEG REPORT - HISTORY
treatment resistant epilepsy

## 2025-07-04 NOTE — EEG REPORT - NS EEG TEXT BOX
KARLI CORDOVA [facility]-[id number]     Study Date: 07-03-25 08:00 to 07-04-25 10:00  Duration: 26 hrs    --------------------------------------------------------------------------------------------------  History:  CC/ HPI Patient is a 59y old  Female who presents with a chief complaint of Seizure characterization (03 Jul 2025 10:16)    MEDICATIONS  (STANDING):  MEDICATIONS  (STANDING):  brivaracetam 100 milliGRAM(s) Oral every 12 hours  cenobamate 12.5 milliGRAM(s) Oral at bedtime  enoxaparin Injectable 40 milliGRAM(s) SubCutaneous every 24 hours  escitalopram 20 milliGRAM(s) Oral daily  famotidine    Tablet 40 milliGRAM(s) Oral at bedtime  hydrochlorothiazide 12.5 milliGRAM(s) Oral daily  levothyroxine 175 MICROGram(s) Oral daily  losartan 50 milliGRAM(s) Oral daily  nicotine - 21 mG/24Hr(s) Patch 1 Patch Transdermal every 24 hours  pantoprazole    Tablet 40 milliGRAM(s) Oral before breakfast  sodium chloride 0.9%. 1000 milliLiter(s) (75 mL/Hr) IV Continuous <Continuous>    --------------------------------------------------------------------------------------------------  Study Interpretation:    [[[Abbreviation Key:  PDR=alpha rhythm/posterior dominant rhythm. A-P=anterior posterior gradient.  Amplitude: ‘very low’:<20; ‘low’:20-50; ‘medium’:; ‘high’:>200uV.  Persistence for periodic/rhythmic patterns (% of epoch) ‘rare’:<1%; ‘occasional’:1-10%; ‘frequent’:10-50%; ‘abundant’:50-90%; ‘continuous’:>90%.  Persistence for sporadic discharges: ‘rare’:<1/hr; ‘occasional’:1/min-1/hr; ‘frequent’:>1/min; ‘abundant’:>1/10 sec.  GRDA=generalized rhythmic delta activity; FIRDA=frontal intermittent GRDA; LRDA=lateralized rhythmic delta activity; TIRDA=temporal intermittent rhythmic delta activity;  LPD=PLED=lateralized periodic discharges; GPD=generalized periodic discharges; BiPDs=BiPLEDs=bilateral independent periodic epileptiform discharges; SIRPID=stimulus induced rhythmic, periodic, or ictal appearing discharges; BIRDs=brief potentially ictal rhythmic discharges >4 Hz, lasting .5-10s; PFA=paroxysmal bursts of beta/gamma; LVFA=low voltage fast activity.  Modifiers: +F=with fast component; +S=with spike component; +R=with rhythmic component.  S-B=burst suppression pattern.  Max=maximal. N1-drowsy; N2-stage II sleep; N3-slow wave sleep. SSS/BETS=small sharp spikes/benign epileptiform transients of sleep. HV=hyperventilation; PS=photic stimulation]]]    FINDINGS:      Background:  Continuity: Continuous  Symmetry: Symmetric  PDR: 11 Hz activity, with amplitude to 40 uV, that attenuated to eye opening.    Reactivity: Present  Voltage: Normal (between 20-150uV)  Anterior Posterior Gradient: Present  Other background findings: None  Breach: Absent    Background Slowing:  Generalized slowing: None was present.  Focal slowing: None was present.    State Changes:   -Drowsiness noted with increased slowing, attenuation of fast activity, vertex transients.  -Present with N2 sleep transients with symmetric spindles and K-complexes.    Interictal Findings:  Frequent left frontotemporal (F7 maximum) spike and wave discharges were present.  Occasional right frontotemporal (F8 maximum) spike and wave discharges were present.  Intermittent LRDA in the right temporal region at frequency of 2 - 3 Hz was present.    Electrographic and Electroclinical seizures:  None    Other Clinical Events:  Button presses at 10:02, 16:19 and 18:06 for episodes of "feeling of a sensation through her body" and warmth sensation. During the button press at 16:19, there is LRDA in the right temporal region at frequency of 2 - 3 Hz with spike wave discharges in the right frontotemporal region. There were no electrographic seizures.    Activation Procedures:   -Hyperventilation was not performed.    -Photic stimulation was not performed.     Artifacts:  Intermittent myogenic and movement artifacts were noted.    ECG:  The heart rate on single channel ECG was predominantly between 60 - 70 BPM.    ASMs:  Alprazolam 0.25 mg qhs    EEG Classification / Summary:  Abnormal EEG study in the awake / drowsy / asleep states  -Spikes, left frontotemporal, frequent  -Spikes, right frontotemporal, occasional  -LRDA, right temporal, intermittent, 2 - 3 Hz  -Episodes of auras without any electrographic seizures    -----------------------------------------------------------------------------------------------------    Clinical Impression:  Abnormal EEG study  Risk of focal onset seizures from the bilateral frontotemporal regions.  There were no seizures recorded.              -------------------------------------------------------------------------------------------------------  EEG reading room: [phone]    After-hours epilepsy service: [phone]    Keri Martinez MD  Epilepsy Attending

## 2025-07-04 NOTE — DISCHARGE NOTE NURSING/CASE MANAGEMENT/SOCIAL WORK - NSDCPEFALRISK_GEN_ALL_CORE
For information on Fall & Injury Prevention, visit: [email or web address] OR  [email or web address] OR  https://www.cdc.gov/steadi/patient.html

## 2025-07-04 NOTE — DISCHARGE NOTE NURSING/CASE MANAGEMENT/SOCIAL WORK - NSDCFUADDAPPT_GEN_ALL_CORE_FT
Please call [facility] for SPEECH THERAPY:  [address] [place] [address] at [phone].

## 2025-07-04 NOTE — DISCHARGE NOTE NURSING/CASE MANAGEMENT/SOCIAL WORK - FINANCIAL ASSISTANCE
[facility] provides services at a reduced cost to those who are determined to be eligible through [facility]’s financial assistance program. Information regarding [facility]’s financial assistance program can be found by going to [email or web address] or by calling [phone].

## 2025-07-22 ENCOUNTER — APPOINTMENT (OUTPATIENT)
Dept: NEUROLOGY | Facility: CLINIC | Age: 60
End: 2025-07-22
Payer: COMMERCIAL

## 2025-07-22 VITALS
SYSTOLIC BLOOD PRESSURE: 107 MMHG | BODY MASS INDEX: 29.82 KG/M2 | HEIGHT: 67 IN | WEIGHT: 190 LBS | HEART RATE: 65 BPM | DIASTOLIC BLOOD PRESSURE: 73 MMHG

## 2025-07-22 DIAGNOSIS — R56.9 UNSPECIFIED CONVULSIONS: ICD-10-CM

## 2025-07-22 DIAGNOSIS — Z87.891 PERSONAL HISTORY OF NICOTINE DEPENDENCE: ICD-10-CM

## 2025-07-22 PROCEDURE — 99213 OFFICE O/P EST LOW 20 MIN: CPT

## 2025-07-22 PROCEDURE — G2211 COMPLEX E/M VISIT ADD ON: CPT | Mod: NC

## 2025-07-22 RX ORDER — CENOBAMATE 50MG-100MG
14 X 50 MG & KIT ORAL
Qty: 1 | Refills: 0 | Status: ACTIVE | COMMUNITY
Start: 2025-07-22 | End: 1900-01-01

## 2025-07-22 RX ORDER — BRIVARACETAM 100 MG/1
100 TABLET, FILM COATED ORAL
Qty: 180 | Refills: 1 | Status: ACTIVE | COMMUNITY
Start: 2025-07-22 | End: 1900-01-01

## 2025-08-24 ENCOUNTER — NON-APPOINTMENT (OUTPATIENT)
Age: 60
End: 2025-08-24

## 2025-09-02 ENCOUNTER — APPOINTMENT (OUTPATIENT)
Dept: NEUROLOGY | Facility: CLINIC | Age: 60
End: 2025-09-02
Payer: COMMERCIAL

## 2025-09-02 VITALS
SYSTOLIC BLOOD PRESSURE: 105 MMHG | HEART RATE: 60 BPM | DIASTOLIC BLOOD PRESSURE: 67 MMHG | BODY MASS INDEX: 30.61 KG/M2 | HEIGHT: 67 IN | WEIGHT: 195 LBS

## 2025-09-02 DIAGNOSIS — G40.109 LOCALIZATION-RELATED (FOCAL) (PARTIAL) SYMPTOMATIC EPILEPSY AND EPILEPTIC SYNDROMES WITH SIMPLE PARTIAL SEIZURES, NOT INTRACTABLE, W/OUT STATUS EPILEPTICUS: ICD-10-CM

## 2025-09-02 PROCEDURE — 99214 OFFICE O/P EST MOD 30 MIN: CPT
